# Patient Record
Sex: MALE | Race: WHITE | NOT HISPANIC OR LATINO | Employment: OTHER | URBAN - METROPOLITAN AREA
[De-identification: names, ages, dates, MRNs, and addresses within clinical notes are randomized per-mention and may not be internally consistent; named-entity substitution may affect disease eponyms.]

---

## 2022-05-02 PROCEDURE — 87077 CULTURE AEROBIC IDENTIFY: CPT | Performed by: SPECIALIST

## 2022-05-03 ENCOUNTER — LAB REQUISITION (OUTPATIENT)
Dept: LAB | Facility: HOSPITAL | Age: 77
End: 2022-05-03
Payer: MEDICARE

## 2022-05-03 DIAGNOSIS — R82.991 HYPOCITRATURIA: ICD-10-CM

## 2022-05-03 DIAGNOSIS — R30.9 PAINFUL MICTURITION, UNSPECIFIED: ICD-10-CM

## 2022-05-03 DIAGNOSIS — R31.1 BENIGN ESSENTIAL MICROSCOPIC HEMATURIA: ICD-10-CM

## 2022-05-05 LAB — BACTERIA UR CULT: ABNORMAL

## 2023-10-16 ENCOUNTER — LAB REQUISITION (OUTPATIENT)
Dept: LAB | Facility: HOSPITAL | Age: 78
End: 2023-10-16
Payer: MEDICARE

## 2023-10-16 DIAGNOSIS — R82.991 HYPOCITRATURIA: ICD-10-CM

## 2023-10-16 DIAGNOSIS — R30.9 PAINFUL MICTURITION, UNSPECIFIED: ICD-10-CM

## 2023-10-16 PROCEDURE — 87205 SMEAR GRAM STAIN: CPT | Performed by: SPECIALIST

## 2023-10-16 PROCEDURE — 87088 URINE BACTERIA CULTURE: CPT | Performed by: SPECIALIST

## 2023-10-16 PROCEDURE — 87077 CULTURE AEROBIC IDENTIFY: CPT | Performed by: SPECIALIST

## 2023-10-17 LAB
GRAM STN SPEC: NORMAL
GRAM STN SPEC: NORMAL

## 2023-10-18 LAB — BACTERIA UR CULT: ABNORMAL

## 2023-10-23 RX ORDER — TAMSULOSIN HYDROCHLORIDE 0.4 MG/1
0.4 CAPSULE ORAL DAILY
COMMUNITY

## 2023-10-23 RX ORDER — SIMVASTATIN 10 MG/1
10 TABLET, FILM COATED ORAL NIGHTLY
COMMUNITY

## 2023-10-23 RX ORDER — ASPIRIN 81 MG/1
81 TABLET ORAL DAILY
COMMUNITY

## 2023-10-23 RX ORDER — CIPROFLOXACIN 500 MG/1
500 TABLET ORAL 2 TIMES DAILY
COMMUNITY

## 2023-10-23 RX ORDER — FINASTERIDE 5 MG/1
5 TABLET, FILM COATED ORAL DAILY
COMMUNITY

## 2023-10-23 RX ORDER — LOSARTAN POTASSIUM AND HYDROCHLOROTHIAZIDE 12.5; 1 MG/1; MG/1
1 TABLET ORAL DAILY
COMMUNITY

## 2023-10-23 RX ORDER — MULTIVIT WITH MINERALS/HERBS
1 TABLET ORAL DAILY
COMMUNITY

## 2023-10-24 ENCOUNTER — HOSPITAL ENCOUNTER (OUTPATIENT)
Facility: HOSPITAL | Age: 78
Discharge: HOME | End: 2023-10-24
Attending: INTERNAL MEDICINE | Admitting: INTERNAL MEDICINE
Payer: MEDICARE

## 2023-10-24 ENCOUNTER — ANESTHESIA (OUTPATIENT)
Dept: ENDOSCOPY | Facility: HOSPITAL | Age: 78
End: 2023-10-24
Payer: MEDICARE

## 2023-10-24 ENCOUNTER — ANESTHESIA EVENT (OUTPATIENT)
Dept: ENDOSCOPY | Facility: HOSPITAL | Age: 78
End: 2023-10-24
Payer: MEDICARE

## 2023-10-24 DIAGNOSIS — D12.4 ADENOMATOUS POLYP OF DESCENDING COLON: Primary | ICD-10-CM

## 2023-10-24 DIAGNOSIS — Z86.010 PERSONAL HISTORY OF COLONIC POLYPS: ICD-10-CM

## 2023-10-24 PROBLEM — Z86.0100 PERSONAL HISTORY OF COLONIC POLYPS: Status: ACTIVE | Noted: 2023-10-24

## 2023-10-24 PROCEDURE — D9220A PRA ANESTHESIA: Mod: PT,ANES,, | Performed by: ANESTHESIOLOGY

## 2023-10-24 PROCEDURE — 25000003 PHARM REV CODE 250: Performed by: ANESTHESIOLOGY

## 2023-10-24 PROCEDURE — D9220A PRA ANESTHESIA: ICD-10-PCS | Mod: PT,ANES,, | Performed by: ANESTHESIOLOGY

## 2023-10-24 PROCEDURE — D9220A PRA ANESTHESIA: ICD-10-PCS | Mod: PT,CRNA,, | Performed by: NURSE ANESTHETIST, CERTIFIED REGISTERED

## 2023-10-24 PROCEDURE — 88305 TISSUE EXAM BY PATHOLOGIST: CPT | Performed by: INTERNAL MEDICINE

## 2023-10-24 PROCEDURE — 37000009 HC ANESTHESIA EA ADD 15 MINS: Performed by: INTERNAL MEDICINE

## 2023-10-24 PROCEDURE — 45385 COLONOSCOPY W/LESION REMOVAL: CPT | Mod: PT | Performed by: INTERNAL MEDICINE

## 2023-10-24 PROCEDURE — 27201423 OPTIME MED/SURG SUP & DEVICES STERILE SUPPLY: Performed by: INTERNAL MEDICINE

## 2023-10-24 PROCEDURE — 63600175 PHARM REV CODE 636 W HCPCS: Performed by: ANESTHESIOLOGY

## 2023-10-24 PROCEDURE — D9220A PRA ANESTHESIA: Mod: PT,CRNA,, | Performed by: NURSE ANESTHETIST, CERTIFIED REGISTERED

## 2023-10-24 PROCEDURE — 37000008 HC ANESTHESIA 1ST 15 MINUTES: Performed by: INTERNAL MEDICINE

## 2023-10-24 RX ORDER — LIDOCAINE HYDROCHLORIDE 20 MG/ML
INJECTION, SOLUTION EPIDURAL; INFILTRATION; INTRACAUDAL; PERINEURAL
Status: DISCONTINUED
Start: 2023-10-24 | End: 2023-10-24 | Stop reason: HOSPADM

## 2023-10-24 RX ORDER — PROPOFOL 10 MG/ML
VIAL (ML) INTRAVENOUS
Status: DISCONTINUED | OUTPATIENT
Start: 2023-10-24 | End: 2023-10-24

## 2023-10-24 RX ORDER — LIDOCAINE HYDROCHLORIDE 20 MG/ML
INJECTION INTRAVENOUS
Status: DISCONTINUED | OUTPATIENT
Start: 2023-10-24 | End: 2023-10-24

## 2023-10-24 RX ORDER — PROPOFOL 10 MG/ML
VIAL (ML) INTRAVENOUS
Status: COMPLETED
Start: 2023-10-24 | End: 2023-10-24

## 2023-10-24 RX ORDER — SODIUM CHLORIDE, SODIUM GLUCONATE, SODIUM ACETATE, POTASSIUM CHLORIDE AND MAGNESIUM CHLORIDE 30; 37; 368; 526; 502 MG/100ML; MG/100ML; MG/100ML; MG/100ML; MG/100ML
INJECTION, SOLUTION INTRAVENOUS ONCE
Status: COMPLETED | OUTPATIENT
Start: 2023-10-24 | End: 2023-10-24

## 2023-10-24 RX ADMIN — SODIUM CHLORIDE, SODIUM GLUCONATE, SODIUM ACETATE, POTASSIUM CHLORIDE AND MAGNESIUM CHLORIDE: 526; 502; 368; 37; 30 INJECTION, SOLUTION INTRAVENOUS at 10:10

## 2023-10-24 RX ADMIN — PROPOFOL 20 MG: 10 INJECTION, EMULSION INTRAVENOUS at 11:10

## 2023-10-24 RX ADMIN — LIDOCAINE HYDROCHLORIDE 50 MG: 20 INJECTION INTRAVENOUS at 11:10

## 2023-10-24 RX ADMIN — PROPOFOL 60 MG: 10 INJECTION, EMULSION INTRAVENOUS at 11:10

## 2023-10-24 RX ADMIN — SODIUM CHLORIDE, SODIUM GLUCONATE, SODIUM ACETATE, POTASSIUM CHLORIDE AND MAGNESIUM CHLORIDE: 526; 502; 368; 37; 30 INJECTION, SOLUTION INTRAVENOUS at 11:10

## 2023-10-24 NOTE — OP NOTE
Colonoscopy Procedure Note    Date of procedure: 10/24/2023     Surgeon: Kamaljit Hines    Referring MD:Vega      Procedure: Colonoscopy    Indications:  Personal history of numerous adenomatous colon polyps       Post op Diagnosis:  Good colon prep, successful endoscopy to the cecum and into a normal terminal ileum.  Single polyp at 50 cm in the descending colon, 4 mm in size, addressed with a cold snare.         Sedation: Per anesthesia:       Procedure Details: The patient was brought to the endoscopy suite after the risks, benefits, and alternatives of the procedure were described and the patient was given the opportunity to ask questions and signed informed consent. Patient was positioned in the left lateral decubitus position, continuous monitoring was initiated, and supplemental oxygen was provided via nasal cannula. Adequate sedation was achieved with the medications documented in chart and titrated during the procedure. A digital rectal exam was performed. Under direct visualization the colonoscope was introduced into the rectum and advanced to the cecum. The ileocecal valve and appendiceal orifice were identified. The terminal ileum was   intubated. The scope was withdrawn, and the mucosa was examined in a circular fashion. Retroflexion was done in the rectum. Air was evacuated from the colon and the procedure was completed. The patient tolerated the procedure well and was transferred to the recovery area in stable condition.     Findings:  The patient had a good preparation, and we saw through to the cecum and into a normal terminal ileum.  We did find 1 small polyp addressed as noted.    Impression and Recommendations:   Patient's exam was reassuring save for the single small polyp.  Given his years, this will serve as his last routine exam.  His children should certainly be screened at 46.  If he has more in the way of dysphagia down the line, upper endoscopy for dilation would be prudent.       Kamaljit  MD Donny

## 2023-10-24 NOTE — DISCHARGE SUMMARY
[facility] Endoscopy  Discharge Note  Short Stay    Procedure(s) (LRB):  COLON (N/A)      OUTCOME: Patient tolerated treatment/procedure well without complication and is now ready for discharge.    DISPOSITION: Home or Self Care    FINAL DIAGNOSIS:  <principal problem not specified>    FOLLOWUP: With primary care provider    DISCHARGE INSTRUCTIONS:    Discharge Procedure Orders   Diet general         Clinical Reference Documents Added to Patient Instructions         Document    COLONOSCOPY DISCHARGE INSTRUCTIONS (ENGLISH)            TIME SPENT ON DISCHARGE: 15 minutes

## 2023-10-24 NOTE — ANESTHESIA POSTPROCEDURE EVALUATION
Anesthesia Post Evaluation    Patient: Eligio Phillip    Procedure(s) Performed: Procedure(s) (LRB):  COLON (N/A)    Final Anesthesia Type: general      Patient location during evaluation: PACU  Patient participation: Yes- Able to Participate  Level of consciousness: awake and alert  Post-procedure vital signs: reviewed and stable  Pain management: adequate  Airway patency: patent    PONV status at discharge: No PONV  Anesthetic complications: no      Cardiovascular status: hemodynamically stable  Respiratory status: unassisted  Hydration status: euvolemic  Follow-up not needed.          Vitals Value Taken Time   /80 10/24/23 1207   Temp ** 10/24/23 1235   Pulse 89 10/24/23 1207   Resp 14 10/24/23 1138   SpO2 100 % 10/24/23 1207         No case tracking events are documented in the log.      Pain/Violeta Score: Violeta Score: 10 (10/24/2023 11:49 AM)

## 2023-10-24 NOTE — H&P
History and Physical           HPI:     Patient is a 77 y.o. male known from previous evaluation.  He does have a personal history of adenomatous colon polyps, and in fact typically has quite a number of polyps at endoscopy.  He was last evaluated in July of 2020 in that regard.  He had a good prep, and we addressed quite a number of polyps, propose a repeat exam at this juncture.      He suggests that he is now plagued by neuropathy, so his mobility is limited, and he has had resultant constipation.  He is otherwise without concern.      He does have a history of previous meat impaction, and Dr. Diop dilated him in my absence back in 2016.  He did undergo repeat endoscopy and dilation in 2017, though he tends to have a mucosal tear with 46 Iranian dilation.    PCP:  Andrew Glover Jr., MD    Review of patient's allergies indicates:  No Known Allergies     Past Medical History:  Past Medical History:   Diagnosis Date    Essential (primary) hypertension     Kidney infection     Kidney stones       Past Surgical History:  Past Surgical History:   Procedure Laterality Date    SINUS SURGERY        Family History:  History reviewed. No pertinent family history.  Social History:  Social History     Tobacco Use    Smoking status: Former     Types: Cigarettes     Start date: 1963    Smokeless tobacco: Current     Types: Chew   Substance Use Topics    Alcohol use: Not on file         Review of Systems:     Review of Systems    Objective:     VITAL SIGNS: 24 HR MIN & MAX LAST    Temp  Min: 97.9 °F (36.6 °C)  Max: 97.9 °F (36.6 °C)  97.9 °F (36.6 °C)        BP  Min: 119/64  Max: 119/64  119/64     Pulse  Min: 77  Max: 77  77     Resp  Min: 18  Max: 18  18    SpO2  Min: 96 %  Max: 96 %  96 %      Physical Exam  Constitutional:       Appearance: Normal appearance.   HENT:      Head: Atraumatic.      Mouth/Throat:      Mouth: Mucous membranes are moist.   Eyes:      Extraocular Movements: Extraocular movements intact.       Pupils: Pupils are equal, round, and reactive to light.   Cardiovascular:      Rate and Rhythm: Normal rate and regular rhythm.      Heart sounds: Normal heart sounds.   Pulmonary:      Effort: Pulmonary effort is normal.      Breath sounds: Normal breath sounds.   Abdominal:      Palpations: Abdomen is soft.      Tenderness: There is no abdominal tenderness.   Musculoskeletal:      Right lower leg: No edema.      Left lower leg: No edema.   Skin:     General: Skin is warm and dry.   Neurological:      General: No focal deficit present.      Mental Status: He is alert and oriented to person, place, and time.   Psychiatric:         Mood and Affect: Mood normal.         Behavior: Behavior normal.           No results found for this or any previous visit (from the past 48 hour(s)).    No results found.    @[facility]@    Assessment /Plan:   77-year-old with a history of multiple adenomatous colon polyps scheduled for surveillance colonoscopy  There are no problems to display for this patient.       Thank you for allowing us to participate in this patient's care.      None

## 2023-10-24 NOTE — ANESTHESIA PREPROCEDURE EVALUATION
10/24/2023  Eligio Phillip is a 77 y.o., male with ----------------------------  Essential (primary) hypertension  Kidney infection  Kidney stones  Wheelchair bound  UTI - chronic  HLD  Neuropathy      And ----------------------------  Sinus surgery    Presents for colonoscopy.      Pre-op Assessment    I have reviewed the NPO Status.      Review of Systems      Physical Exam  General: Well nourished, Cooperative, Alert and Oriented  Frail appearing  Airway:  Mallampati: II   Mouth Opening: Normal  TM Distance: Normal  Tongue: Normal  Neck ROM: Normal ROM    Dental:  Dentures  To be removed  Chest/Lungs:  Clear to auscultation, Normal Respiratory Rate    Heart:  Rate: Normal  Rhythm: Regular Rhythm        Anesthesia Plan  Type of Anesthesia, risks & benefits discussed:    Anesthesia Type: Gen Natural Airway  Intra-op Monitoring Plan: Standard ASA Monitors  Post Op Pain Control Plan: IV/PO Opioids PRN  Induction:  IV  Airway Plan: Direct  Informed Consent: Informed consent signed with the Patient and all parties understand the risks and agree with anesthesia plan.  All questions answered. Patient consented to blood products? No  ASA Score: 3  Day of Surgery Review of History & Physical: H&P Update referred to the surgeon/provider.  Anesthesia Plan Notes: Nasal cannula vs facemask supplemental oxygenation   For patients with MIGUEL/obesity, may consider SuperNoval Nasal CPAP      Ready For Surgery From Anesthesia Perspective.     .

## 2023-10-24 NOTE — TRANSFER OF CARE
Anesthesia Transfer of Care Note    Patient: Eligio Phillip    Procedure(s) Performed: Procedure(s) (LRB):  COLON (N/A)    Patient location: GI    Anesthesia Type: general    Transport from OR: Transported from OR on room air with adequate spontaneous ventilation    Post pain: adequate analgesia    Post assessment: no apparent anesthetic complications and tolerated procedure well    Post vital signs: stable    Level of consciousness: awake and alert    Nausea/Vomiting: no nausea/vomiting    Complications: none          Last vitals:   100/68, 74, 94%, 14

## 2023-10-25 VITALS
RESPIRATION RATE: 14 BRPM | BODY MASS INDEX: 21.47 KG/M2 | SYSTOLIC BLOOD PRESSURE: 118 MMHG | OXYGEN SATURATION: 100 % | HEIGHT: 70 IN | DIASTOLIC BLOOD PRESSURE: 80 MMHG | HEART RATE: 89 BPM | TEMPERATURE: 98 F | WEIGHT: 150 LBS

## 2023-10-25 LAB — PSYCHE PATHOLOGY RESULT: NORMAL

## 2023-12-14 ENCOUNTER — LAB REQUISITION (OUTPATIENT)
Dept: LAB | Facility: HOSPITAL | Age: 78
End: 2023-12-14
Payer: MEDICARE

## 2023-12-14 DIAGNOSIS — N39.0 URINARY TRACT INFECTION, SITE NOT SPECIFIED: ICD-10-CM

## 2023-12-14 PROCEDURE — 87086 URINE CULTURE/COLONY COUNT: CPT | Performed by: SPECIALIST

## 2023-12-16 LAB — BACTERIA UR CULT: NORMAL

## 2024-07-18 ENCOUNTER — LAB REQUISITION (OUTPATIENT)
Dept: LAB | Facility: HOSPITAL | Age: 79
End: 2024-07-18
Payer: MEDICARE

## 2024-07-18 DIAGNOSIS — R35.1 NOCTURIA: ICD-10-CM

## 2024-07-18 DIAGNOSIS — N39.0 URINARY TRACT INFECTION, SITE NOT SPECIFIED: ICD-10-CM

## 2024-07-18 DIAGNOSIS — R39.15 URGENCY OF URINATION: ICD-10-CM

## 2024-07-18 PROCEDURE — 87184 SC STD DISK METHOD PER PLATE: CPT | Performed by: SPECIALIST

## 2024-07-18 PROCEDURE — 87205 SMEAR GRAM STAIN: CPT | Performed by: SPECIALIST

## 2024-07-18 PROCEDURE — 87077 CULTURE AEROBIC IDENTIFY: CPT | Performed by: SPECIALIST

## 2024-07-19 LAB
GRAM STN SPEC: NORMAL

## 2024-07-21 LAB
BACTERIA UR CULT: ABNORMAL
BACTERIA UR CULT: ABNORMAL

## 2025-03-03 ENCOUNTER — HOSPITAL ENCOUNTER (OUTPATIENT)
Facility: HOSPITAL | Age: 80
Discharge: HOME | End: 2025-03-04
Attending: INTERNAL MEDICINE | Admitting: SURGERY
Payer: MEDICARE

## 2025-03-03 DIAGNOSIS — S06.5XAA SUBDURAL HEMATOMA: ICD-10-CM

## 2025-03-03 DIAGNOSIS — S06.2X1A: Primary | ICD-10-CM

## 2025-03-03 PROBLEM — S06.33AA INTRAPARENCHYMAL HEMATOMA OF BRAIN DUE TO TRAUMA: Status: ACTIVE | Noted: 2025-03-03

## 2025-03-03 LAB
ALBUMIN SERPL-MCNC: 3.7 G/DL (ref 3.4–4.8)
ALBUMIN/GLOB SERPL: 1.1 RATIO (ref 1.1–2)
ALP SERPL-CCNC: 89 UNIT/L (ref 40–150)
ALT SERPL-CCNC: 16 UNIT/L (ref 0–55)
ANION GAP SERPL CALC-SCNC: 12 MEQ/L
APTT PPP: 35.4 SECONDS (ref 23.2–33.7)
AST SERPL-CCNC: 21 UNIT/L (ref 5–34)
BASOPHILS # BLD AUTO: 0.07 X10(3)/MCL
BASOPHILS NFR BLD AUTO: 0.5 %
BILIRUB SERPL-MCNC: 0.5 MG/DL
BUN SERPL-MCNC: 17.2 MG/DL (ref 8.4–25.7)
CALCIUM SERPL-MCNC: 9.2 MG/DL (ref 8.8–10)
CHLORIDE SERPL-SCNC: 102 MMOL/L (ref 98–107)
CO2 SERPL-SCNC: 26 MMOL/L (ref 23–31)
CREAT SERPL-MCNC: 1.07 MG/DL (ref 0.72–1.25)
CREAT/UREA NIT SERPL: 16
EOSINOPHIL # BLD AUTO: 0.12 X10(3)/MCL (ref 0–0.9)
EOSINOPHIL NFR BLD AUTO: 0.8 %
ERYTHROCYTE [DISTWIDTH] IN BLOOD BY AUTOMATED COUNT: 12.6 % (ref 11.5–17)
GFR SERPLBLD CREATININE-BSD FMLA CKD-EPI: >60 ML/MIN/1.73/M2
GLOBULIN SER-MCNC: 3.4 GM/DL (ref 2.4–3.5)
GLUCOSE SERPL-MCNC: 108 MG/DL (ref 82–115)
HCT VFR BLD AUTO: 38.6 % (ref 42–52)
HGB BLD-MCNC: 12.9 G/DL (ref 14–18)
IMM GRANULOCYTES # BLD AUTO: 0.06 X10(3)/MCL (ref 0–0.04)
IMM GRANULOCYTES NFR BLD AUTO: 0.4 %
INR PPP: 0.9
LYMPHOCYTES # BLD AUTO: 1.95 X10(3)/MCL (ref 0.6–4.6)
LYMPHOCYTES NFR BLD AUTO: 13.8 %
MCH RBC QN AUTO: 31 PG (ref 27–31)
MCHC RBC AUTO-ENTMCNC: 33.4 G/DL (ref 33–36)
MCV RBC AUTO: 92.8 FL (ref 80–94)
MONOCYTES # BLD AUTO: 1.32 X10(3)/MCL (ref 0.1–1.3)
MONOCYTES NFR BLD AUTO: 9.3 %
NEUTROPHILS # BLD AUTO: 10.64 X10(3)/MCL (ref 2.1–9.2)
NEUTROPHILS NFR BLD AUTO: 75.2 %
NRBC BLD AUTO-RTO: 0 %
PLATELET # BLD AUTO: 321 X10(3)/MCL (ref 130–400)
PMV BLD AUTO: 11.1 FL (ref 7.4–10.4)
POTASSIUM SERPL-SCNC: 4 MMOL/L (ref 3.5–5.1)
PROT SERPL-MCNC: 7.1 GM/DL (ref 5.8–7.6)
PROTHROMBIN TIME: 12.4 SECONDS (ref 12.5–14.5)
RBC # BLD AUTO: 4.16 X10(6)/MCL (ref 4.7–6.1)
SODIUM SERPL-SCNC: 140 MMOL/L (ref 136–145)
WBC # BLD AUTO: 14.16 X10(3)/MCL (ref 4.5–11.5)

## 2025-03-03 PROCEDURE — 80053 COMPREHEN METABOLIC PANEL: CPT

## 2025-03-03 PROCEDURE — G0378 HOSPITAL OBSERVATION PER HR: HCPCS

## 2025-03-03 PROCEDURE — 94799 UNLISTED PULMONARY SVC/PX: CPT

## 2025-03-03 PROCEDURE — 99900035 HC TECH TIME PER 15 MIN (STAT)

## 2025-03-03 PROCEDURE — 85730 THROMBOPLASTIN TIME PARTIAL: CPT

## 2025-03-03 PROCEDURE — 99285 EMERGENCY DEPT VISIT HI MDM: CPT | Mod: 25

## 2025-03-03 PROCEDURE — 25000003 PHARM REV CODE 250

## 2025-03-03 PROCEDURE — 99223 1ST HOSP IP/OBS HIGH 75: CPT | Mod: AI,GC,, | Performed by: SURGERY

## 2025-03-03 PROCEDURE — 85025 COMPLETE CBC W/AUTO DIFF WBC: CPT

## 2025-03-03 PROCEDURE — 85610 PROTHROMBIN TIME: CPT

## 2025-03-03 RX ORDER — METHOCARBAMOL 500 MG/1
500 TABLET, FILM COATED ORAL EVERY 8 HOURS
Status: DISCONTINUED | OUTPATIENT
Start: 2025-03-03 | End: 2025-03-04 | Stop reason: HOSPADM

## 2025-03-03 RX ORDER — POLYETHYLENE GLYCOL 3350 17 G/17G
17 POWDER, FOR SOLUTION ORAL 2 TIMES DAILY
Status: DISCONTINUED | OUTPATIENT
Start: 2025-03-03 | End: 2025-03-04 | Stop reason: HOSPADM

## 2025-03-03 RX ORDER — OXYCODONE HYDROCHLORIDE 10 MG/1
10 TABLET ORAL EVERY 4 HOURS PRN
Refills: 0 | Status: DISCONTINUED | OUTPATIENT
Start: 2025-03-03 | End: 2025-03-04 | Stop reason: HOSPADM

## 2025-03-03 RX ORDER — ACETAMINOPHEN 325 MG/1
650 TABLET ORAL EVERY 6 HOURS
Status: DISCONTINUED | OUTPATIENT
Start: 2025-03-03 | End: 2025-03-04 | Stop reason: HOSPADM

## 2025-03-03 RX ORDER — TALC
6 POWDER (GRAM) TOPICAL NIGHTLY PRN
Status: DISCONTINUED | OUTPATIENT
Start: 2025-03-03 | End: 2025-03-04 | Stop reason: HOSPADM

## 2025-03-03 RX ORDER — LEVETIRACETAM 500 MG/1
500 TABLET ORAL 2 TIMES DAILY
Status: DISCONTINUED | OUTPATIENT
Start: 2025-03-03 | End: 2025-03-04 | Stop reason: HOSPADM

## 2025-03-03 RX ORDER — ADHESIVE BANDAGE
30 BANDAGE TOPICAL DAILY PRN
Status: DISCONTINUED | OUTPATIENT
Start: 2025-03-03 | End: 2025-03-04 | Stop reason: HOSPADM

## 2025-03-03 RX ORDER — GABAPENTIN 300 MG/1
300 CAPSULE ORAL 3 TIMES DAILY
Status: DISCONTINUED | OUTPATIENT
Start: 2025-03-03 | End: 2025-03-04 | Stop reason: HOSPADM

## 2025-03-03 RX ORDER — LABETALOL HYDROCHLORIDE 5 MG/ML
10 INJECTION, SOLUTION INTRAVENOUS EVERY 6 HOURS PRN
Status: DISCONTINUED | OUTPATIENT
Start: 2025-03-03 | End: 2025-03-04 | Stop reason: HOSPADM

## 2025-03-03 RX ORDER — OXYCODONE HYDROCHLORIDE 5 MG/1
5 TABLET ORAL EVERY 4 HOURS PRN
Refills: 0 | Status: DISCONTINUED | OUTPATIENT
Start: 2025-03-03 | End: 2025-03-04 | Stop reason: HOSPADM

## 2025-03-03 RX ORDER — DOCUSATE SODIUM 100 MG/1
100 CAPSULE, LIQUID FILLED ORAL 2 TIMES DAILY
Status: DISCONTINUED | OUTPATIENT
Start: 2025-03-03 | End: 2025-03-04 | Stop reason: HOSPADM

## 2025-03-03 RX ORDER — HYDRALAZINE HYDROCHLORIDE 20 MG/ML
10 INJECTION INTRAMUSCULAR; INTRAVENOUS EVERY 6 HOURS PRN
Status: DISCONTINUED | OUTPATIENT
Start: 2025-03-03 | End: 2025-03-04 | Stop reason: HOSPADM

## 2025-03-03 RX ADMIN — LEVETIRACETAM 500 MG: 500 TABLET, FILM COATED ORAL at 09:03

## 2025-03-03 RX ADMIN — METHOCARBAMOL 500 MG: 500 TABLET ORAL at 09:03

## 2025-03-03 RX ADMIN — POLYETHYLENE GLYCOL 3350 17 G: 17 POWDER, FOR SOLUTION ORAL at 09:03

## 2025-03-03 RX ADMIN — GABAPENTIN 300 MG: 300 CAPSULE ORAL at 09:03

## 2025-03-03 RX ADMIN — DOCUSATE SODIUM 100 MG: 100 CAPSULE, LIQUID FILLED ORAL at 09:03

## 2025-03-03 NOTE — ED PROVIDER NOTES
Encounter Date: 3/3/2025    SCRIBE #1 NOTE: I, Lisa Stuart, am scribing for, and in the presence of,  Jerry Man DO. I have scribed the following portions of the note - Other sections scribed: HPI, ROS, PE.       History     Chief Complaint   Patient presents with    Fall     Patient reports falling backwards out of wheelchair, - thinners, + LOC, GCS 15 in triage. Denies vision changes.      Patient is a 79 year old male with a history of hypertension that presents to the ED following a fall at home. Patient states he went down a small ramp in his wheelchair and fell backwards, hitting his head on the sidewalk. He reports possible LOC. He complains of head pain and dizziness. Notes relief with Advil taken at home. He denies neck pain and back pain. He is prescribed a baby ASA.    The history is provided by the patient and medical records.     Review of patient's allergies indicates:  No Known Allergies  Past Medical History:   Diagnosis Date    Essential (primary) hypertension     Kidney infection     Kidney stones      Past Surgical History:   Procedure Laterality Date    COLONOSCOPY, WITH 1 OR MORE BIOPSIES N/A 10/24/2023    Procedure: COLON;  Surgeon: Kamaljit Hines MD;  Location: [facility] ENDOSCOPY;  Service: Gastroenterology;  Laterality: N/A;    SINUS SURGERY       No family history on file.  Social History[1]  Review of Systems   Constitutional:  Negative for fever.   HENT:  Negative for sore throat.         Head pain   Respiratory:  Negative for shortness of breath.    Cardiovascular:  Negative for chest pain.   Gastrointestinal:  Negative for nausea.   Genitourinary:  Negative for dysuria.   Musculoskeletal:  Negative for back pain, neck pain and neck stiffness.   Skin:  Negative for rash.   Neurological:  Positive for dizziness. Negative for tremors, seizures, syncope, facial asymmetry, speech difficulty, weakness, light-headedness, numbness and headaches.        Possible LOC.    Hematological:   Does not bruise/bleed easily.   All other systems reviewed and are negative.      Physical Exam     Initial Vitals [03/03/25 1527]   BP Pulse Resp Temp SpO2   128/83 (!) 112 20 97.4 °F (36.3 °C) 97 %      MAP       --         Physical Exam    Constitutional: He appears well-developed and well-nourished. He is cooperative.  Non-toxic appearance. He does not appear ill.   HENT:   Head: Normocephalic. Mouth/Throat: Uvula is midline, oropharynx is clear and moist and mucous membranes are normal.   Non-tender hematoma to occiput. No abrasions or lacerations.    Eyes: Conjunctivae, EOM and lids are normal. Pupils are equal, round, and reactive to light.   Neck: Trachea normal and phonation normal. Neck supple.    Full passive range of motion without pain.     Cardiovascular:  Normal rate, regular rhythm, normal heart sounds and normal pulses.           No murmur heard.  Pulmonary/Chest: No accessory muscle usage. No tachypnea. No respiratory distress. He has no decreased breath sounds. He has no wheezes. He has no rhonchi. He has no rales.   Abdominal: Abdomen is soft. Bowel sounds are normal. He exhibits no distension. There is no abdominal tenderness. No hernia. There is no rebound and no guarding.   Musculoskeletal:      Cervical back: Full passive range of motion without pain and neck supple.      Comments: Pelvis stable.      Neurological: He is alert and oriented to person, place, and time. GCS score is 15. GCS eye subscore is 4. GCS verbal subscore is 5. GCS motor subscore is 6.   No neurological focal deficits.    Skin: Skin is warm, dry and intact. Capillary refill takes less than 2 seconds. No rash noted.   Psychiatric: He has a normal mood and affect. His speech is normal and behavior is normal. Judgment and thought content normal. Cognition and memory are normal.         ED Course   Critical Care    Date/Time: 3/3/2025 5:24 PM    Performed by: Jerry Man DO  Authorized by: Jerry Man DO  Direct  patient critical care time: 15 minutes  Additional history critical care time: 5 minutes  Ordering / reviewing critical care time: 5 minutes  Documentation critical care time: 5 minutes  Consulting other physicians critical care time: 5 minutes  Consult with family critical care time: 5 minutes  Total critical care time (exclusive of procedural time) : 40 minutes  Critical care time was exclusive of separately billable procedures and treating other patients.  Critical care was necessary to treat or prevent imminent or life-threatening deterioration of the following conditions: trauma and CNS failure or compromise.  Critical care was time spent personally by me on the following activities: development of treatment plan with patient or surrogate, discussions with consultants, examination of patient, obtaining history from patient or surrogate, ordering and performing treatments and interventions, ordering and review of laboratory studies, ordering and review of radiographic studies, pulse oximetry, re-evaluation of patient's condition and review of old charts.        Labs Reviewed   APTT - Abnormal       Result Value    PTT 35.4 (*)    PROTIME-INR - Abnormal    PT 12.4 (*)     INR 0.9      Narrative:     Protimes are used to monitor anticoagulant agents such as warfarin. PT INR values are based on the current patient normal mean and the ELADIO value for the specific instrument reagent used.  **Routine theraputic target values for the INR are 2.0-3.0**   CBC WITH DIFFERENTIAL - Abnormal    WBC 14.16 (*)     RBC 4.16 (*)     Hgb 12.9 (*)     Hct 38.6 (*)     MCV 92.8      MCH 31.0      MCHC 33.4      RDW 12.6      Platelet 321      MPV 11.1 (*)     Neut % 75.2      Lymph % 13.8      Mono % 9.3      Eos % 0.8      Basophil % 0.5      Imm Grans % 0.4      Neut # 10.64 (*)     Lymph # 1.95      Mono # 1.32 (*)     Eos # 0.12      Baso # 0.07      Imm Gran # 0.06 (*)     NRBC% 0.0     CBC W/ AUTO DIFFERENTIAL    Narrative:      The following orders were created for panel order CBC auto differential.  Procedure                               Abnormality         Status                     ---------                               -----------         ------                     CBC with Differential[[id number]]       Abnormal            Final result                 Please view results for these tests on the individual orders.   COMPREHENSIVE METABOLIC PANEL    Sodium 140      Potassium 4.0      Chloride 102      CO2 26      Glucose 108      Blood Urea Nitrogen 17.2      Creatinine 1.07      Calcium 9.2      Protein Total 7.1      Albumin 3.7      Globulin 3.4      Albumin/Globulin Ratio 1.1      Bilirubin Total 0.5      ALP 89      ALT 16      AST 21      eGFR >60      Anion Gap 12.0      BUN/Creatinine Ratio 16            Imaging Results               CT Head Without Contrast (Final result)  Result time 03/03/25 15:52:14      Final result by Ronald Gama MD (03/03/25 15:52:14)                   Impression:      Couple of punctate areas of hyperattenuation seen as outlined above could represent small areas of hemorrhage as outlined above.  Short-term follow-up is recommended    Cephalhematoma posteriorly in the midline near the cerebral convexity    This report was flagged in Epic as abnormal.      Electronically signed by: Bhupinder Gama  Date:    03/03/2025  Time:    15:52               Narrative:    EXAMINATION:  CT HEAD WITHOUT CONTRAST    CLINICAL HISTORY:  Head trauma, moderate-severe;    TECHNIQUE:  Multiple axial images were obtained from the base of the brain to the vertex without contrast administration.  Sagittal and coronal reconstructions were performed. .Automatic exposure control  (AEC) is utilized to reduce patient radiation exposure.    COMPARISON:  None    FINDINGS:  There is no intracranial mass or lesion seen.  There is a punctate area of hyperattenuation in the right frontal region at the cerebral convexity image 40  series 5.  It measures 4 mm.  It could represent a small area of subdural blood.  There is area of increased attenuation involving the cortex of the left frontal region posteriorly on image number 39 through 42 series 5.  This could represent a small parenchymal contusion..  There is a punctate area of increased attenuation along the falx on the left side image 42 series 5.  This could represent a small punctate area of hemorrhage.  Short-term follow-up is recommended.  No midline shift is seen.  No infarct is seen.  The ventricles and basilar cisterns appear normal.  .    Posterior fossa appears normal.  The calvarium is intact.  There is a soft tissue cephalhematoma posteriorly in the midline.  It is near the cerebral convexity.  The paranasal sinuses shows evidence of bilateral maxillary sinusitis.  None.                                       Medications   acetaminophen tablet 650 mg (0 mg Oral Hold 3/3/25 1830)   oxyCODONE immediate release tablet 5 mg (has no administration in time range)   oxyCODONE immediate release tablet Tab 10 mg (has no administration in time range)   methocarbamoL tablet 500 mg (has no administration in time range)   gabapentin capsule 300 mg (has no administration in time range)   melatonin tablet 6 mg (has no administration in time range)   polyethylene glycol packet 17 g (has no administration in time range)   docusate sodium capsule 100 mg (has no administration in time range)   magnesium hydroxide 400 mg/5 ml suspension 2,400 mg (has no administration in time range)   levETIRAcetam tablet 500 mg (has no administration in time range)   hydrALAZINE injection 10 mg (has no administration in time range)   labetaloL injection 10 mg (has no administration in time range)     Medical Decision Making  79-year-old male presenting after mechanical fall.  Vital signs show tachycardia but otherwise stable and afebrile    Differential Diagnosis:   Judging by the patient's chief complaint and  pertinent history, the patient has the following possible differential diagnoses, including but not limited to the following.  Some of these are deemed to be lower likelihood and some more likely based on my physical exam and history combined with possible lab work and/or imaging studies.   Please see the pertinent studies, and refer to the HPI.  Some of these diagnoses will take further evaluation to fully rule out, perhaps as an outpatient and the patient was encouraged to follow up when discharged for more comprehensive evaluation      Differential diagnosis includes, but is not limited to, intracranial hemorrhage, fracture, dislocation, contusion, soft tissue hematoma, and mechanical fall.     Problems Addressed:  Intracranial hematoma following injury, with loss of consciousness of 30 minutes or less, initial encounter: acute illness or injury that poses a threat to life or bodily functions  Subdural hematoma: acute illness or injury that poses a threat to life or bodily functions    Amount and/or Complexity of Data Reviewed  Independent Historian: caregiver  External Data Reviewed: labs, radiology and notes.  Labs: ordered. Decision-making details documented in ED Course.  Radiology: ordered and independent interpretation performed. Decision-making details documented in ED Course.    Risk  Decision regarding hospitalization.            Scribe Attestation:   Scribe #1: I performed the above scribed service and the documentation accurately describes the services I performed. I attest to the accuracy of the note.    Attending Attestation:           Physician Attestation for Scribe:  Physician Attestation Statement for Scribe #1: I, Jerry Man DO, reviewed documentation, as scribed by Lisa Stuart in my presence, and it is both accurate and complete.             ED Course as of 03/03/25 1920   Mon Mar 03, 2025   1630 CT of the head is concerning for punctate intracranial hemorrhage, cephalohematoma also  noted. [MM]   1646 Paged neurosurgery  [ED]   1656 Paged trauma surgery  [ED]   1657 Discussed with RENALDO Rao with trauma surgery  [ED]   1718 Discussed with MD Fady with neurosurgery  [ED]   1719 Dr. Shrestha would like repeat head CT in the morning, hold antiplatelets for 1 week, stable for floor admission.  This was discussed with Trauma surgery, who will admit patient. [MM]      ED Course User Index  [ED] Lisa Stuart  [MM] Jerry Man DO                           Clinical Impression:  Final diagnoses:  [S06.2X1A] Intracranial hematoma following injury, with loss of consciousness of 30 minutes or less, initial encounter (Primary)  [S06.5XAA] Subdural hematoma          ED Disposition Condition    Observation                     [1]   Social History  Tobacco Use    Smoking status: Former     Types: Cigarettes     Start date: 1963    Smokeless tobacco: Current     Types: Chew        Jerry Man DO  03/03/25 1920

## 2025-03-03 NOTE — FIRST PROVIDER EVALUATION
"Medical screening examination initiated.  I have conducted a focused provider triage encounter, findings are as follows:    Brief history of present illness:  79 year old male presents to her after fall out of wheelchair. Hit head on concrete. +LOC. Denies neck pain. AAO x 4    Vitals:    03/03/25 1527   BP: 128/83   BP Location: Left arm   Pulse: (!) 112   Resp: 20   Temp: 97.4 °F (36.3 °C)   TempSrc: Oral   SpO2: 97%   Weight: 70.3 kg (155 lb)   Height: 5' 10" (1.778 m)       Pertinent physical exam:  awake and alert, nad    Brief workup plan:  imaging     Preliminary workup initiated; this workup will be continued and followed by the physician or advanced practice provider that is assigned to the patient when roomed.  " Resident Renée: pt re-evaluated clinically, with preliminary evaluation without any demonstration of evidence of acute pathology on exam. Pt was informed that she requires a MRI for further evaluation of her symptoms, as a subacute infarct cannot be ruled out on CT Head alone - but patient declines, understanding, verbalizing all risks, benefits, and treatment alternatives to declining MRI. Our primary team reached out to her Neurologist (Evelio Chandler MD - [phone]) and informed him of her clinical course in the ED, and we arranged for expedited follow up. He will follow up with her outpatient for further evaluation of her symptoms. Clinical re-evaluation with demonstration of stable gait, ability to tolerate PO intake - stable for discharge. VSS. Pt reports she will take cab home.

## 2025-03-03 NOTE — H&P
Trauma Surgery   History and Physical Note    Patient Name: Eligio Phillip  YOB: 1945  Date: 03/03/2025 5:24 PM  Date of Admission: 3/3/2025  HD#0  POD#* No surgery found *    PRESENTING HISTORY   Chief Complaint/Reason for Admission: Subdural hematoma    History of Present Illness:  Patient is a 79 year old male who presents following a fall from his wheelchair. Reports was going up the ramp forward and fell back striking his head. Reports he usually goes up backwards but thought he could go forward. Questionable loss of consciousness by report. Headache following fall, relieved with Advil. He reports he takes a baby aspirin daily. CT head imaging significant for punctate hemorrhages. Neurosurgery recommends admission with repeat CT in AM. Patient reports uses WC at home due to neuropathy.     Review of Systems:  12 point ROS negative except as stated in HPI    PAST HISTORY:   Past medical history:  HTN, prostate CA s/p radiation, neuropathy, kidney stones  Past Medical History:   Diagnosis Date    Essential (primary) hypertension     Kidney infection     Kidney stones        Past surgical history:  Past Surgical History:   Procedure Laterality Date    COLONOSCOPY, WITH 1 OR MORE BIOPSIES N/A 10/24/2023    Procedure: COLON;  Surgeon: Kamaljit Hines MD;  Location: [facility] ENDOSCOPY;  Service: Gastroenterology;  Laterality: N/A;    SINUS SURGERY         Family history:  No family history on file.    Social history:  Social History     Socioeconomic History    Marital status:    Tobacco Use    Smoking status: Former     Types: Cigarettes     Start date: 1963    Smokeless tobacco: Current     Types: Chew     Social Drivers of Health     Financial Resource Strain: Low Risk  (6/3/2024)    [facility] and Affiliates    Overall Financial Resource Strain (CARDIA)     Difficulty of Paying Living Expenses: Not hard at all    Food Insecurity: No Food Insecurity (6/3/2024)    [facility]    Hunger Vital Sign     Worried About Running Out of Food in the Last Year: Never true     Ran Out of Food in the Last Year: Never true   Transportation Needs: No Transportation Needs (6/3/2024)    [facility]    PRAPARE - Transportation     Lack of Transportation (Medical): No     Lack of Transportation (Non-Medical): No   Physical Activity: Inactive (6/3/2024)    [facility]    Exercise Vital Sign     Days of Exercise per Week: 0 days     Minutes of Exercise per Session: 0 min   Stress: No Stress Concern Present (6/3/2024)    [facility]    [employer] [place] [employer] - Occupational Stress Questionnaire     Feeling of Stress : Not at all   Housing Stability: Low Risk  (6/3/2024)    [facility]    Housing Stability Vital Sign     Unable to Pay for Housing in the Last Year: No     Number of Places Lived in the Last Year: 1     Unstable Housing in the Last Year: No     Tobacco Use History[1]   Social History     Substance and Sexual Activity   Alcohol Use None        MEDICATIONS & ALLERGIES:   Allergies: Review of patient's allergies indicates:  No Known Allergies  Home Meds:   Current Outpatient Medications   Medication Instructions    aspirin (ECOTRIN) 81 mg, Oral, Daily    b complex vitamins tablet 1 tablet, Oral, Daily    ciprofloxacin HCl (CIPRO) 500 mg, Oral, 2 times daily    ergocalciferol, vitamin D2, (VITAMIN D ORAL) Oral    finasteride (PROSCAR) 5 mg, Oral, Daily    fluticasone (VERAMYST) 27.5  "mcg/actuation nasal spray 2 sprays, Nasal, Daily PRN    losartan-hydrochlorothiazide 100-12.5 mg (HYZAAR) 100-12.5 mg Tab 1 tablet, Oral, Daily    simvastatin (ZOCOR) 10 mg, Oral, Nightly    tamsulosin (FLOMAX) 0.4 mg, Oral, Daily    Medications Ordered Prior to Encounter[2]   No current facility-administered medications on file prior to encounter.     Current Outpatient Medications on File Prior to Encounter   Medication Sig    aspirin (ECOTRIN) 81 MG EC tablet Take 81 mg by mouth once daily.    b complex vitamins tablet Take 1 tablet by mouth once daily.    ciprofloxacin HCl (CIPRO) 500 MG tablet Take 500 mg by mouth 2 (two) times daily.    ergocalciferol, vitamin D2, (VITAMIN D ORAL) Take by mouth.    finasteride (PROSCAR) 5 mg tablet Take 5 mg by mouth once daily.    fluticasone (VERAMYST) 27.5 mcg/actuation nasal spray 2 sprays by Nasal route daily as needed for Rhinitis.    losartan-hydrochlorothiazide 100-12.5 mg (HYZAAR) 100-12.5 mg Tab Take 1 tablet by mouth once daily.    simvastatin (ZOCOR) 10 MG tablet Take 10 mg by mouth every evening.    tamsulosin (FLOMAX) 0.4 mg Cap Take 0.4 mg by mouth once daily.     OBJECTIVE:   Vital Signs:  VITAL SIGNS: 24 HR MIN & MAX LAST   Temp  Min: 97.4 °F (36.3 °C)  Max: 97.4 °F (36.3 °C)  97.4 °F (36.3 °C)   BP  Min: 128/83  Max: 128/83  128/83    Pulse  Min: 112  Max: 112  (!) 112    Resp  Min: 20  Max: 20  20    SpO2  Min: 97 %  Max: 97 %  97 %      HT: 5' 10" (177.8 cm)  WT: 70.3 kg (155 lb)  BMI: 22.2   Intake/output: No intake/output data recorded.       Physical Exam:  General:  Well developed, well nourished, no acute distress  HEENT:  Normocephalic, small contusion posterior scalp  CV:  RR, 2+ radials bilaterally  Resp/chest: NWOB  GI:  Abdomen soft, non-tender, non-distended  :  Deferred  MSK:  No muscle atrophy, cyanosis, peripheral edema, moving all extremities spontaneously.   Neuro: GCS 15. CNII-XII grossly intact, alert and oriented to person, place, and " "time. Strength and motor function grossly intact to all extremities, sensation intact to all extremities.  Skin/Wounds:  warm, dry    Labs:  Troponin:  No results for input(s): "TROPONINI" in the last 72 hours.  CBC:  Recent Labs     03/03/25  1643   WBC 14.16*   RBC 4.16*   HGB 12.9*   HCT 38.6*      MCV 92.8   MCH 31.0   MCHC 33.4     CMP:  Recent Labs     03/03/25  1643   CALCIUM 9.2   ALBUMIN 3.7      K 4.0   CO2 26      BUN 17.2   CREATININE 1.07   ALKPHOS 89   ALT 16   AST 21   BILITOT 0.5     Lactic Acid:  No results for input(s): "LACTATE" in the last 72 hours.  ETOH:  No results for input(s): "ETHANOL" in the last 72 hours.   Urine Drug Screen:  No results for input(s): "COCAINE", "OPIATE", "BARBITURATE", "AMPHETAMINE", "FENTANYL", "CANNABINOIDS", "MDMA" in the last 72 hours.    Invalid input(s): "BENZODIAZEPINE", "PHENCYCLIDINE"   ABG  No results for input(s): "PH", "PO2", "PCO2", "HCO3", "BE" in the last 168 hours.    I have reviewed all pertinent lab results within the past 24 hours.    Diagnostic Results:  Imaging Results               CT Head Without Contrast (Final result)  Result time 03/03/25 15:52:14      Final result by Ronald Gama MD (03/03/25 15:52:14)                   Impression:      Couple of punctate areas of hyperattenuation seen as outlined above could represent small areas of hemorrhage as outlined above.  Short-term follow-up is recommended    Cephalhematoma posteriorly in the midline near the cerebral convexity    This report was flagged in Epic as abnormal.      Electronically signed by: Bhupinder Gama  Date:    03/03/2025  Time:    15:52               Narrative:    EXAMINATION:  CT HEAD WITHOUT CONTRAST    CLINICAL HISTORY:  Head trauma, moderate-severe;    TECHNIQUE:  Multiple axial images were obtained from the base of the brain to the vertex without contrast administration.  Sagittal and coronal reconstructions were performed. .Automatic exposure " control  (AEC) is utilized to reduce patient radiation exposure.    COMPARISON:  None    FINDINGS:  There is no intracranial mass or lesion seen.  There is a punctate area of hyperattenuation in the right frontal region at the cerebral convexity image 40 series 5.  It measures 4 mm.  It could represent a small area of subdural blood.  There is area of increased attenuation involving the cortex of the left frontal region posteriorly on image number 39 through 42 series 5.  This could represent a small parenchymal contusion..  There is a punctate area of increased attenuation along the falx on the left side image 42 series 5.  This could represent a small punctate area of hemorrhage.  Short-term follow-up is recommended.  No midline shift is seen.  No infarct is seen.  The ventricles and basilar cisterns appear normal.  .    Posterior fossa appears normal.  The calvarium is intact.  There is a soft tissue cephalhematoma posteriorly in the midline.  It is near the cerebral convexity.  The paranasal sinuses shows evidence of bilateral maxillary sinusitis.  None.                                     I have reviewed all pertinent imaging results/findings within the past 24 hours.    ASSESSMENT & PLAN:      Punctate SDH/IPH  - Neurosurgery consulted  - Q2H neuro checks  - Keppra BID x 7 days  - BP < 150/90  - CT Head in AM    Fall from WC  - Cardiac diet  - Daily labs   - MM pain control  - Frequent IS  - SCDs and Holding anticoagulation in light of ICH    - home med rec    Maira Holloway, AGACN-BC, FNP-BC  Trauma Surgery  [facility]  C: [phone]           [1]   Social History  Tobacco Use   Smoking Status Former    Types: Cigarettes    Start date: 1963   Smokeless Tobacco Current    Types: Chew   [2]   No current facility-administered medications on file prior to encounter.     Current Outpatient Medications on File Prior to Encounter   Medication Sig Dispense Refill    aspirin (ECOTRIN) 81 MG EC  tablet Take 81 mg by mouth once daily.      b complex vitamins tablet Take 1 tablet by mouth once daily.      ciprofloxacin HCl (CIPRO) 500 MG tablet Take 500 mg by mouth 2 (two) times daily.      ergocalciferol, vitamin D2, (VITAMIN D ORAL) Take by mouth.      finasteride (PROSCAR) 5 mg tablet Take 5 mg by mouth once daily.      fluticasone (VERAMYST) 27.5 mcg/actuation nasal spray 2 sprays by Nasal route daily as needed for Rhinitis.      losartan-hydrochlorothiazide 100-12.5 mg (HYZAAR) 100-12.5 mg Tab Take 1 tablet by mouth once daily.      simvastatin (ZOCOR) 10 MG tablet Take 10 mg by mouth every evening.      tamsulosin (FLOMAX) 0.4 mg Cap Take 0.4 mg by mouth once daily.

## 2025-03-04 VITALS
BODY MASS INDEX: 22.19 KG/M2 | WEIGHT: 155 LBS | HEART RATE: 75 BPM | SYSTOLIC BLOOD PRESSURE: 119 MMHG | TEMPERATURE: 97 F | DIASTOLIC BLOOD PRESSURE: 64 MMHG | OXYGEN SATURATION: 93 % | HEIGHT: 70 IN | RESPIRATION RATE: 18 BRPM

## 2025-03-04 LAB
ALBUMIN SERPL-MCNC: 3.4 G/DL (ref 3.4–4.8)
ALBUMIN/GLOB SERPL: 1.2 RATIO (ref 1.1–2)
ALP SERPL-CCNC: 82 UNIT/L (ref 40–150)
ALT SERPL-CCNC: 14 UNIT/L (ref 0–55)
ANION GAP SERPL CALC-SCNC: 8 MEQ/L
AST SERPL-CCNC: 16 UNIT/L (ref 5–34)
BASOPHILS # BLD AUTO: 0.06 X10(3)/MCL
BASOPHILS NFR BLD AUTO: 0.5 %
BILIRUB SERPL-MCNC: 0.6 MG/DL
BUN SERPL-MCNC: 22.5 MG/DL (ref 8.4–25.7)
CALCIUM SERPL-MCNC: 9.1 MG/DL (ref 8.8–10)
CHLORIDE SERPL-SCNC: 101 MMOL/L (ref 98–107)
CO2 SERPL-SCNC: 31 MMOL/L (ref 23–31)
CREAT SERPL-MCNC: 1.13 MG/DL (ref 0.72–1.25)
CREAT/UREA NIT SERPL: 20
EOSINOPHIL # BLD AUTO: 0.22 X10(3)/MCL (ref 0–0.9)
EOSINOPHIL NFR BLD AUTO: 1.9 %
ERYTHROCYTE [DISTWIDTH] IN BLOOD BY AUTOMATED COUNT: 12.8 % (ref 11.5–17)
GFR SERPLBLD CREATININE-BSD FMLA CKD-EPI: >60 ML/MIN/1.73/M2
GLOBULIN SER-MCNC: 2.8 GM/DL (ref 2.4–3.5)
GLUCOSE SERPL-MCNC: 95 MG/DL (ref 82–115)
HCT VFR BLD AUTO: 35.3 % (ref 42–52)
HGB BLD-MCNC: 11.8 G/DL (ref 14–18)
IMM GRANULOCYTES # BLD AUTO: 0.04 X10(3)/MCL (ref 0–0.04)
IMM GRANULOCYTES NFR BLD AUTO: 0.4 %
LYMPHOCYTES # BLD AUTO: 3.13 X10(3)/MCL (ref 0.6–4.6)
LYMPHOCYTES NFR BLD AUTO: 27.7 %
MAGNESIUM SERPL-MCNC: 1.9 MG/DL (ref 1.6–2.6)
MCH RBC QN AUTO: 30.9 PG (ref 27–31)
MCHC RBC AUTO-ENTMCNC: 33.4 G/DL (ref 33–36)
MCV RBC AUTO: 92.4 FL (ref 80–94)
MONOCYTES # BLD AUTO: 1.45 X10(3)/MCL (ref 0.1–1.3)
MONOCYTES NFR BLD AUTO: 12.8 %
NEUTROPHILS # BLD AUTO: 6.4 X10(3)/MCL (ref 2.1–9.2)
NEUTROPHILS NFR BLD AUTO: 56.7 %
NRBC BLD AUTO-RTO: 0 %
PHOSPHATE SERPL-MCNC: 3.8 MG/DL (ref 2.3–4.7)
PLATELET # BLD AUTO: 270 X10(3)/MCL (ref 130–400)
PMV BLD AUTO: 10.5 FL (ref 7.4–10.4)
POTASSIUM SERPL-SCNC: 4.1 MMOL/L (ref 3.5–5.1)
PROT SERPL-MCNC: 6.2 GM/DL (ref 5.8–7.6)
RBC # BLD AUTO: 3.82 X10(6)/MCL (ref 4.7–6.1)
SODIUM SERPL-SCNC: 140 MMOL/L (ref 136–145)
WBC # BLD AUTO: 11.3 X10(3)/MCL (ref 4.5–11.5)

## 2025-03-04 PROCEDURE — 84100 ASSAY OF PHOSPHORUS: CPT

## 2025-03-04 PROCEDURE — G0378 HOSPITAL OBSERVATION PER HR: HCPCS

## 2025-03-04 PROCEDURE — 80053 COMPREHEN METABOLIC PANEL: CPT

## 2025-03-04 PROCEDURE — 36415 COLL VENOUS BLD VENIPUNCTURE: CPT

## 2025-03-04 PROCEDURE — 25000003 PHARM REV CODE 250

## 2025-03-04 PROCEDURE — 85025 COMPLETE CBC W/AUTO DIFF WBC: CPT

## 2025-03-04 PROCEDURE — 94799 UNLISTED PULMONARY SVC/PX: CPT

## 2025-03-04 PROCEDURE — 99900031 HC PATIENT EDUCATION (STAT)

## 2025-03-04 PROCEDURE — 99900035 HC TECH TIME PER 15 MIN (STAT)

## 2025-03-04 PROCEDURE — 83735 ASSAY OF MAGNESIUM: CPT

## 2025-03-04 RX ORDER — LEVETIRACETAM 500 MG/1
500 TABLET ORAL 2 TIMES DAILY
Qty: 12 TABLET | Refills: 0 | Status: SHIPPED | OUTPATIENT
Start: 2025-03-04 | End: 2025-03-10

## 2025-03-04 RX ORDER — GABAPENTIN 300 MG/1
300 CAPSULE ORAL 3 TIMES DAILY
Qty: 30 CAPSULE | Refills: 0 | Status: SHIPPED | OUTPATIENT
Start: 2025-03-04 | End: 2025-03-14

## 2025-03-04 RX ORDER — METHOCARBAMOL 500 MG/1
500 TABLET, FILM COATED ORAL EVERY 8 HOURS PRN
Qty: 30 TABLET | Refills: 0 | Status: SHIPPED | OUTPATIENT
Start: 2025-03-04 | End: 2025-03-14

## 2025-03-04 RX ORDER — MONTELUKAST SODIUM 10 MG/1
10 TABLET ORAL NIGHTLY
COMMUNITY

## 2025-03-04 RX ORDER — LORATADINE 10 MG/1
10 TABLET ORAL DAILY
COMMUNITY

## 2025-03-04 RX ADMIN — DOCUSATE SODIUM 100 MG: 100 CAPSULE, LIQUID FILLED ORAL at 08:03

## 2025-03-04 RX ADMIN — ACETAMINOPHEN 650 MG: 325 TABLET, FILM COATED ORAL at 12:03

## 2025-03-04 RX ADMIN — GABAPENTIN 300 MG: 300 CAPSULE ORAL at 08:03

## 2025-03-04 RX ADMIN — LEVETIRACETAM 500 MG: 500 TABLET, FILM COATED ORAL at 08:03

## 2025-03-04 RX ADMIN — POLYETHYLENE GLYCOL 3350 17 G: 17 POWDER, FOR SOLUTION ORAL at 08:03

## 2025-03-04 RX ADMIN — ACETAMINOPHEN 650 MG: 325 TABLET, FILM COATED ORAL at 06:03

## 2025-03-04 RX ADMIN — METHOCARBAMOL 500 MG: 500 TABLET ORAL at 06:03

## 2025-03-04 NOTE — PLAN OF CARE
Pt doesn't have a list of his home medications available. He will ask a family member to bring a list or the meds today.    Problem: Adult Inpatient Plan of Care  Goal: Absence of Hospital-Acquired Illness or Injury  Outcome: Progressing     Problem: Adult Inpatient Plan of Care  Goal: Optimal Comfort and Wellbeing  Outcome: Progressing     Problem: Fall Injury Risk  Goal: Absence of Fall and Fall-Related Injury  Outcome: Progressing     Problem: Hypertension Acute  Goal: Blood Pressure Within Desired Range  Outcome: Progressing     Problem: Seizure, Active Management  Goal: Absence of Seizure/Seizure-Related Injury  Outcome: Progressing

## 2025-03-04 NOTE — PLAN OF CARE
Problem: Adult Inpatient Plan of Care  Goal: Plan of Care Review  Outcome: Progressing  Goal: Patient-Specific Goal (Individualized)  Outcome: Progressing  Goal: Absence of Hospital-Acquired Illness or Injury  Outcome: Progressing  Goal: Optimal Comfort and Wellbeing  Outcome: Progressing  Goal: Readiness for Transition of Care  Outcome: Progressing     Problem: Fall Injury Risk  Goal: Absence of Fall and Fall-Related Injury  Outcome: Progressing     Problem: Hypertension Acute  Goal: Blood Pressure Within Desired Range  Outcome: Progressing     Problem: Seizure, Active Management  Goal: Absence of Seizure/Seizure-Related Injury  Outcome: Progressing

## 2025-03-04 NOTE — PROGRESS NOTES
TERTIARY TRAUMA SURVEY (TTS)    List Injuries Identified to Date:   1. SDH  2. IPH    [x]Problems list reviewed  List Operations and Procedures:   1. None    Past Surgical History:   Procedure Laterality Date    COLONOSCOPY, WITH 1 OR MORE BIOPSIES N/A 10/24/2023    Procedure: COLON;  Surgeon: Kamaljit Hines MD;  Location: [facility] ENDOSCOPY;  Service: Gastroenterology;  Laterality: N/A;    SINUS SURGERY         Incidental findings:   1. None    Past Medical History:   1. HTN  2. HLD  3. Neuropathy  4. COPD  5. BPH    Active Ambulatory Problems     Diagnosis Date Noted    Adenomatous polyp of descending colon 10/24/2023    Personal history of colonic polyps 10/24/2023     Resolved Ambulatory Problems     Diagnosis Date Noted    No Resolved Ambulatory Problems     Past Medical History:   Diagnosis Date    Essential (primary) hypertension     Kidney infection     Kidney stones      Past Medical History:   Diagnosis Date    Essential (primary) hypertension     Kidney infection     Kidney stones        Tertiary Physical Exam:     Physical Exam  General:  Well developed, well nourished, no acute distress  HEENT:  Normocephalic, small contusion posterior scalp  CV:  RR, 2+ radials bilaterally  Resp/chest: NWOB  GI:  Abdomen soft, non-tender, non-distended  MSK:  No muscle atrophy, cyanosis, peripheral edema, moving all extremities spontaneously.   Neuro: GCS 15. CNII-XII grossly intact, alert and oriented to person, place, and time. Strength and motor function grossly intact to all extremities, sensation intact to all extremities.  Skin/Wounds:  warm, dry    Imaging Review:     Imaging Results               CT Head Without Contrast (Final result)  Result time 03/03/25 15:52:14      Final result by Ronald Gama MD (03/03/25 15:52:14)                   Impression:      Couple of punctate areas of hyperattenuation seen as outlined above could represent small areas of hemorrhage as outlined above.   Short-term follow-up is recommended    Cephalhematoma posteriorly in the midline near the cerebral convexity    This report was flagged in Epic as abnormal.      Electronically signed by: Bhupinder Gama  Date:    03/03/2025  Time:    15:52               Narrative:    EXAMINATION:  CT HEAD WITHOUT CONTRAST    CLINICAL HISTORY:  Head trauma, moderate-severe;    TECHNIQUE:  Multiple axial images were obtained from the base of the brain to the vertex without contrast administration.  Sagittal and coronal reconstructions were performed. .Automatic exposure control  (AEC) is utilized to reduce patient radiation exposure.    COMPARISON:  None    FINDINGS:  There is no intracranial mass or lesion seen.  There is a punctate area of hyperattenuation in the right frontal region at the cerebral convexity image 40 series 5.  It measures 4 mm.  It could represent a small area of subdural blood.  There is area of increased attenuation involving the cortex of the left frontal region posteriorly on image number 39 through 42 series 5.  This could represent a small parenchymal contusion..  There is a punctate area of increased attenuation along the falx on the left side image 42 series 5.  This could represent a small punctate area of hemorrhage.  Short-term follow-up is recommended.  No midline shift is seen.  No infarct is seen.  The ventricles and basilar cisterns appear normal.  .    Posterior fossa appears normal.  The calvarium is intact.  There is a soft tissue cephalhematoma posteriorly in the midline.  It is near the cerebral convexity.  The paranasal sinuses shows evidence of bilateral maxillary sinusitis.  None.                                       Lab Review:   CBC:  Recent Labs   Lab Result Units 03/03/25  1643 03/04/25  0401   WBC x10(3)/mcL 14.16* 11.30   RBC x10(6)/mcL 4.16* 3.82*   Hgb g/dL 12.9* 11.8*   Hct % 38.6* 35.3*   Platelet x10(3)/mcL 321 270   MCV fL 92.8 92.4   MCH pg 31.0 30.9   MCHC g/dL 33.4 33.4  "      CMP:  Recent Labs   Lab Result Units 03/03/25  1643 03/04/25  0401   Calcium mg/dL 9.2 9.1   Albumin g/dL 3.7 3.4   Sodium mmol/L 140 140   Potassium mmol/L 4.0 4.1   CO2 mmol/L 26 31   Chloride mmol/L 102 101   Blood Urea Nitrogen mg/dL 17.2 22.5   Creatinine mg/dL 1.07 1.13   ALP unit/L 89 82   ALT unit/L 16 14   AST unit/L 21 16   Bilirubin Total mg/dL 0.5 0.6       Troponin:  No results for input(s): "TROPONINI" in the last 2160 hours.    ETOH:  No results for input(s): "ETHANOL" in the last 72 hours.     Urine Drug Screen:  No results for input(s): "COCAINE", "OPIATE", "BARBITURATE", "AMPHETAMINE", "FENTANYL", "CANNABINOIDS", "MDMA" in the last 72 hours.    Invalid input(s): "BENZODIAZEPINE", "PHENCYCLIDINE"   Plan:   Punctate SDH/IPH  - Neurosurgery following  - Q2H neuro checks  - Keppra BID x 7 days  - BP < 150/90  - CT Head in AM     Fall from   - Cardiac diet  - Daily labs   - MM pain control  - Frequent IS  - SCDs and Holding anticoagulation in light of ICH      Vandana Donohue [facility]  Trauma Surgery      "

## 2025-03-04 NOTE — DISCHARGE SUMMARY
[facility] - Neurology  General Surgery  Discharge Summary      Patient Name: Eligio Phillip  MRN: [id number]  Admission Date: 3/3/2025  Hospital Length of Stay: 0 days  Discharge Date and Time: 3/4/2025 12:44 PM  Attending Physician: No att. providers found   Discharging Provider: ALBERTO Ventura  Primary Care Provider: Andrew Glover Jr., MD    HPI: Patient is a 79 year old male who presents following a fall from his wheelchair. Reports was going up the ramp forward and fell back striking his head. Reports he usually goes up backwards but thought he could go forward. Questionable loss of consciousness by report. Headache following fall, relieved with Advil. He reports he takes a baby aspirin daily. CT head imaging significant for punctate hemorrhages. Neurosurgery recommends admission with repeat CT in AM. Patient reports uses WC at home due to neuropathy.     * No surgery found *      Indwelling Lines/Drains at time of discharge:   Lines/Drains/Airways       None                 Hospital Course: Patient admitted on 3/3 with punctate hemorrhages in the brain after falling back in his wheelchair and hitting his head. Repeat CT head the next morning did not show any punctate hemorrhage. Patient medically stable. Tolerating regular diet. Voiding spontaneously. In wheelchair at baseline. Discharged home with instructions to follow up with PCP.    Goals of Care Treatment Preferences:  Code Status: Full Code      Consults:   Consults (From admission, onward)          Status Ordering Provider     Inpatient consult to Neurosurgery  Once        Provider:  Meet Castellano MD    Completed JAN MORALES            Significant Diagnostic Studies:   Imaging Results               CT Head Without Contrast (Final result)  Result time 03/03/25 15:52:14      Final result by Ronald Gama MD (03/03/25 15:52:14)                   Impression:      Couple of punctate areas of hyperattenuation  seen as outlined above could represent small areas of hemorrhage as outlined above.  Short-term follow-up is recommended    Cephalhematoma posteriorly in the midline near the cerebral convexity    This report was flagged in Epic as abnormal.      Electronically signed by: Bhupinder Gama  Date:    03/03/2025  Time:    15:52               Narrative:    EXAMINATION:  CT HEAD WITHOUT CONTRAST    CLINICAL HISTORY:  Head trauma, moderate-severe;    TECHNIQUE:  Multiple axial images were obtained from the base of the brain to the vertex without contrast administration.  Sagittal and coronal reconstructions were performed. .Automatic exposure control  (AEC) is utilized to reduce patient radiation exposure.    COMPARISON:  None    FINDINGS:  There is no intracranial mass or lesion seen.  There is a punctate area of hyperattenuation in the right frontal region at the cerebral convexity image 40 series 5.  It measures 4 mm.  It could represent a small area of subdural blood.  There is area of increased attenuation involving the cortex of the left frontal region posteriorly on image number 39 through 42 series 5.  This could represent a small parenchymal contusion..  There is a punctate area of increased attenuation along the falx on the left side image 42 series 5.  This could represent a small punctate area of hemorrhage.  Short-term follow-up is recommended.  No midline shift is seen.  No infarct is seen.  The ventricles and basilar cisterns appear normal.  .    Posterior fossa appears normal.  The calvarium is intact.  There is a soft tissue cephalhematoma posteriorly in the midline.  It is near the cerebral convexity.  The paranasal sinuses shows evidence of bilateral maxillary sinusitis.  None.                                        Pending Diagnostic Studies:       None          Final Active Diagnoses:    Diagnosis Date Noted POA    PRINCIPAL PROBLEM:  Subdural hematoma [S06.5XAA] 03/03/2025 Yes    Intraparenchymal  hematoma of brain due to trauma [S06.33AA] 03/03/2025 Yes      Problems Resolved During this Admission:      Discharged Condition: good    Disposition: Home or Self Care    Follow Up:   Follow-up Information       Callais, Gene T. Jr., MD. Schedule an appointment as soon as possible for a visit in 1 week(s).    Specialty: Family Medicine  Why: For hospital follow up  Contact information:  [address] [place] [address] [address] & [address]  [facility]  [place] [address]  [phone]               [facility] Call.    Specialty: Trauma Surgery  Why: As needed  Contact information:  [address] [place] [address] [address] [place] [address] [phone]                         Patient Instructions:   No discharge procedures on file.  Medications:  Reconciled Home Medications:      Medication List        PAUSE taking these medications      aspirin 81 MG EC tablet  Wait to take this until: March 11, 2025  Commonly known as: ECOTRIN  Take 81 mg by mouth once daily.            START taking these medications      gabapentin 300 MG capsule  Commonly known as: NEURONTIN  Take 1 capsule (300 mg total) by mouth 3 (three) times daily. for 10 days     levETIRAcetam 500 MG Tab  Commonly known as: KEPPRA  Take 1 tablet (500 mg total) by mouth 2 (two) times daily. for 6 days     methocarbamoL 500 MG Tab  Commonly known as: Robaxin  Take 1 tablet (500 mg total) by mouth every 8 (eight) hours as needed (muscle spasms).            CONTINUE taking these medications      b complex vitamins tablet  Take 1 tablet by mouth once daily.     finasteride 5 mg tablet  Commonly known as: PROSCAR  Take 5 mg by mouth once daily.     fluticasone 27.5 mcg/actuation nasal spray  Commonly known as: VERAMYST  2 sprays by Nasal route daily as needed for Rhinitis.     loratadine 10 mg tablet  Commonly known as: CLARITIN  Take 10 mg by mouth once daily.     losartan-hydrochlorothiazide 100-12.5 mg 100-12.5 mg Tab  Commonly known as:  HYZAAR  Take 1 tablet by mouth once daily.     montelukast 10 mg tablet  Commonly known as: SINGULAIR  Take 10 mg by mouth every evening.     simvastatin 10 MG tablet  Commonly known as: ZOCOR  Take 10 mg by mouth every evening.     tamsulosin 0.4 mg Cap  Commonly known as: FLOMAX  Take 0.4 mg by mouth once daily.     VITAMIN D ORAL  Take by mouth.            Time spent on the discharge of patient: 20 minutes    ALBERTO Ventura  Trauma Surgery

## 2025-03-04 NOTE — PROGRESS NOTES
Discharge instructions reviewed with patient and wife. Patient leaving via own wheelchair, stable on room air. Patient x1 standby assist to wheelchair. IV removed. Patient to be transported downstairs by natalie whenever ready.

## 2025-03-04 NOTE — CONSULTS
[facility] - Neurology  Neurosurgery  Consult Note    Inpatient consult to Neurosurgery  Consult performed by: Sandy Cao NP  Consult ordered by: Jerry Man DO        Subjective:     Chief Complaint/Reason for Admission: SDH    History of Present Illness:    Patient is a 79-year-old male with a past medical history of HTN, HLD, and neuropathy who presented to [facility] ED after a fall.  He reported he was going up on the ramp with his wheelchair forward and fell back striking his head.  He stated that he usually goes up the ramp backwards in his wheelchair.  He was unsure if he lost consciousness after he fell.  After the fall, patient complained of a headache and took an Advil.  The initial CT head imaging showed questionable hemorrhage of the brain.  Neurosurgery is consulted for further evaluation.    CT head without contrast on 3/3/2025 at 1545 showed:  FINDINGS:  There is no intracranial mass or lesion seen.  There is a punctate area of hyperattenuation in the right frontal region at the cerebral convexity image 40 series 5.  It measures 4 mm.  It could represent a small area of subdural blood.  There is area of increased attenuation involving the cortex of the left frontal region posteriorly on image number 39 through 42 series 5.  This could represent a small parenchymal contusion..  There is a punctate area of increased attenuation along the falx on the left side image 42 series 5.  This could represent a small punctate area of hemorrhage.  Short-term follow-up is recommended.  No midline shift is seen.  No infarct is seen.  The ventricles and basilar cisterns appear normal.  .     Posterior fossa appears normal.  The calvarium is intact.  There is a soft tissue cephalhematoma posteriorly in the midline.  It is near the cerebral convexity.  The paranasal sinuses shows evidence of bilateral maxillary sinusitis.  None.     Impression:     Couple of punctate areas of hyperattenuation seen as  outlined above could represent small areas of hemorrhage as outlined above.  Short-term follow-up is recommended     Cephalhematoma posteriorly in the midline near the cerebral convexity      On physical examination, patient is laying in bed with no family members at the bedside.  He is awake, alert, and oriented to self, place, month, and year.  He open eyes spontaneously.  His speech is clear and coherent.  No pronator drift.  He has full strength in the BUE. He has 4/5 strength in BLE.  He denies headache, dizziness, and visual disturbances.  He complains of some soreness in the back of his head where he hit his head and there is a small bruise. He states he takes a baby aspirin.       PTA Medications   Medication Sig    aspirin (ECOTRIN) 81 MG EC tablet Take 81 mg by mouth once daily.    b complex vitamins tablet Take 1 tablet by mouth once daily.    ciprofloxacin HCl (CIPRO) 500 MG tablet Take 500 mg by mouth 2 (two) times daily.    ergocalciferol, vitamin D2, (VITAMIN D ORAL) Take by mouth.    finasteride (PROSCAR) 5 mg tablet Take 5 mg by mouth once daily.    fluticasone (VERAMYST) 27.5 mcg/actuation nasal spray 2 sprays by Nasal route daily as needed for Rhinitis.    losartan-hydrochlorothiazide 100-12.5 mg (HYZAAR) 100-12.5 mg Tab Take 1 tablet by mouth once daily.    simvastatin (ZOCOR) 10 MG tablet Take 10 mg by mouth every evening.    tamsulosin (FLOMAX) 0.4 mg Cap Take 0.4 mg by mouth once daily.       Review of patient's allergies indicates:  No Known Allergies    Past Medical History:   Diagnosis Date    Essential (primary) hypertension     Kidney infection     Kidney stones      Past Surgical History:   Procedure Laterality Date    COLONOSCOPY, WITH 1 OR MORE BIOPSIES N/A 10/24/2023    Procedure: COLON;  Surgeon: Kamaljit Hines MD;  Location: [facility] ENDOSCOPY;  Service: Gastroenterology;  Laterality: N/A;    SINUS SURGERY       Family History    None       Tobacco Use    Smoking status: Former      Types: Cigarettes     Start date: 1963    Smokeless tobacco: Current     Types: Chew   Substance and Sexual Activity    Alcohol use: Not on file    Drug use: Not on file    Sexual activity: Not on file     Review of Systems   Constitutional: Negative.    HENT: Negative.     Eyes: Negative.    Respiratory: Negative.     Cardiovascular: Negative.    Gastrointestinal: Negative.    Endocrine: Negative.    Genitourinary: Negative.    Musculoskeletal: Negative.    Skin:         Bruise in the back of head   Allergic/Immunologic: Negative.    Neurological: Negative.    Hematological: Negative.    Psychiatric/Behavioral: Negative.       Objective:     Weight: 70.3 kg (154 lb 15.7 oz)  Body mass index is 22.24 kg/m².  Vital Signs (Most Recent):  Temp: 97.5 °F (36.4 °C) (03/04/25 0722)  Pulse: 78 (03/04/25 0722)  Resp: 19 (03/04/25 0722)  BP: 107/74 (03/04/25 0722)  SpO2: (!) 94 % (03/04/25 0722) Vital Signs (24h Range):  Temp:  [97.4 °F (36.3 °C)-97.9 °F (36.6 °C)] 97.5 °F (36.4 °C)  Pulse:  [] 78  Resp:  [15-20] 19  SpO2:  [93 %-98 %] 94 %  BP: (107-151)/(71-83) 107/74                              Physical Exam:  Nursing note and vitals reviewed.    Constitutional: He appears well-developed and well-nourished. No distress.     Eyes: Pupils are equal, round, and reactive to light. EOM are normal.     Cardiovascular: Normal pulses and intact distal pulses.     Abdominal: Soft. Bowel sounds are normal.     Skin:   Bruise in the back of head with no opening or drainage     Psych/Behavior: He is alert. He is oriented to person, place, and time. He has a normal mood and affect.     Musculoskeletal:      Comments: 5/5 strength in BUE  4/5 strength in BLE     Neurological:   GCS: 15  E: 4, V: 5, M: 6  Awake, alert and oriented to self, place, month, and year   Opens eyes spontaneously   Pupils are 3 mm, equal and brisk   Speech is clear and coherent   No facial droop   No pronator drift          Significant Labs:  Recent  Labs   Lab 03/03/25  1643 03/04/25  0401    140   K 4.0 4.1    101   CO2 26 31   BUN 17.2 22.5   CREATININE 1.07 1.13   CALCIUM 9.2 9.1   MG  --  1.90     Recent Labs   Lab 03/03/25  1643 03/04/25  0401   WBC 14.16* 11.30   HGB 12.9* 11.8*   HCT 38.6* 35.3*    270     Recent Labs   Lab 03/03/25  1643   INR 0.9   APTT 35.4*     Microbiology Results (last 7 days)       ** No results found for the last 168 hours. **            Significant Diagnostics:  CT head without contrast on 3/3/2025 at 1545 showed:  FINDINGS:  There is no intracranial mass or lesion seen.  There is a punctate area of hyperattenuation in the right frontal region at the cerebral convexity image 40 series 5.  It measures 4 mm.  It could represent a small area of subdural blood.  There is area of increased attenuation involving the cortex of the left frontal region posteriorly on image number 39 through 42 series 5.  This could represent a small parenchymal contusion..  There is a punctate area of increased attenuation along the falx on the left side image 42 series 5.  This could represent a small punctate area of hemorrhage.  Short-term follow-up is recommended.  No midline shift is seen.  No infarct is seen.  The ventricles and basilar cisterns appear normal.  .     Posterior fossa appears normal.  The calvarium is intact.  There is a soft tissue cephalhematoma posteriorly in the midline.  It is near the cerebral convexity.  The paranasal sinuses shows evidence of bilateral maxillary sinusitis.  None.     Impression:     Couple of punctate areas of hyperattenuation seen as outlined above could represent small areas of hemorrhage as outlined above.  Short-term follow-up is recommended     Cephalhematoma posteriorly in the midline near the cerebral convexity      Repeat CT head without contrast on 3/4/2025 at 0557 showed:  FINDINGS:  BRAIN: Gray white differentiation is maintained. White matter is within normal limits for age.  No  hemorrhage. No edema. No mass effect or midline shift.  The posterior fossa and midline structures are unremarkable.  There are mild atherosclerotic calcifications.  Mild cerebral atrophy.     VENTRICLES: Normal in size and configuration.     EXTRA-AXIAL: No abnormal extra-axial collections.     BONES: Calvarium is intact.     SINUSES AND MASTOIDS: Visualized paranasal sinuses and mastoid air cells are clear.     Impression:     No appreciable acute intracranial abnormality.  No convincing hemorrhage.  Finding described on prior exam favored to be artifact.     The preliminary and final reports are concordant.      Assessment/Plan:      Plan:  - Floor status  - Neuro checks Q4H  - Pain control  - SCDs for DVT prophylaxis, recommend to start prophylactic lovenox      Patient does not need acute neurosurgical intervention.  Repeat CT head this a.m. shows no hemorrhage.  He can restart his baby aspirin in 1 week.  He does not need to follow up in neurosurgery clinic.  Please reach out with any questions or concerns.  Neurosurgery is signing off.     Active Diagnoses:    Diagnosis Date Noted POA    PRINCIPAL PROBLEM:  Subdural hematoma [S06.5XAA] 03/03/2025 Yes    Intraparenchymal hematoma of brain due to trauma [S06.33AA] 03/03/2025 Yes      Problems Resolved During this Admission:       Thank you for your consult.       JERRELL Dhaliwal-BC  Neurosurgery  [facility] - Neurology

## 2025-03-21 ENCOUNTER — TELEPHONE (OUTPATIENT)
Facility: CLINIC | Age: 80
End: 2025-03-21
Payer: MEDICARE

## 2025-07-07 ENCOUNTER — ANESTHESIA EVENT (OUTPATIENT)
Dept: ENDOSCOPY | Facility: HOSPITAL | Age: 80
End: 2025-07-07
Payer: MEDICARE

## 2025-07-07 NOTE — ANESTHESIA PREPROCEDURE EVALUATION
07/07/2025  Eligio Phillip is a 79 y.o., male presents as an outpatient for EGD (dysphagia).      Lab Results   Component Value Date    WBC 10 03/20/2025    HGB 12.6 (L) 03/20/2025    HCT 39.7 03/20/2025    MCV 92.4 03/04/2025     03/20/2025            Chemistry        Component Value Date/Time     03/04/2025 0401    K 4.1 03/04/2025 0401     03/04/2025 0401    CO2 31 03/04/2025 0401    BUN 22.5 03/04/2025 0401    CREATININE 1.13 03/04/2025 0401    GLU 95 03/04/2025 0401        Component Value Date/Time    CALCIUM 9.1 03/04/2025 0401    ALKPHOS 82 03/04/2025 0401    AST 16 03/04/2025 0401    ALT 14 03/04/2025 0401    BILITOT 0.6 03/04/2025 0401          Pre-op Assessment    I have reviewed the Patient Summary Reports.    I have reviewed the NPO Status.   I have reviewed the Medications.     Review of Systems  Anesthesia Hx:               Denies Personal Hx of Anesthesia complications.                    Social:  Non-Smoker       Hematology/Oncology:       -- Anemia:                                  Cardiovascular:     Hypertension                  Functional Capacity 2 METS, Ambulates with wheelchair secondary to neuropathy                         Renal/:   renal calculi                   Physical Exam  General: Well nourished, Cooperative, Alert and Oriented    Airway:  Mallampati: II   Mouth Opening: Normal  TM Distance: Normal  Tongue: Normal  Neck ROM: Normal ROM    Dental:  Edentulous    Chest/Lungs:  Clear to auscultation, Normal Respiratory Rate    Heart:  Rate: Normal  Rhythm: Regular Rhythm        Anesthesia Plan  Type of Anesthesia, risks & benefits discussed:    Anesthesia Type: Gen Natural Airway  Intra-op Monitoring Plan: Standard ASA Monitors  Induction:  IV  Informed Consent: Informed consent signed with the Patient and all parties understand the risks and agree with  anesthesia plan.  All questions answered.   ASA Score: 3  Day of Surgery Review of History & Physical: H&P Update referred to the surgeon/provider.    Ready For Surgery From Anesthesia Perspective.     .

## 2025-07-08 ENCOUNTER — HOSPITAL ENCOUNTER (OUTPATIENT)
Facility: HOSPITAL | Age: 80
Discharge: HOME | End: 2025-07-08
Attending: INTERNAL MEDICINE | Admitting: INTERNAL MEDICINE
Payer: MEDICARE

## 2025-07-08 ENCOUNTER — ANESTHESIA (OUTPATIENT)
Dept: ENDOSCOPY | Facility: HOSPITAL | Age: 80
End: 2025-07-08
Payer: MEDICARE

## 2025-07-08 VITALS
TEMPERATURE: 97 F | OXYGEN SATURATION: 96 % | RESPIRATION RATE: 13 BRPM | DIASTOLIC BLOOD PRESSURE: 81 MMHG | SYSTOLIC BLOOD PRESSURE: 138 MMHG | HEART RATE: 77 BPM

## 2025-07-08 DIAGNOSIS — R13.19 ESOPHAGEAL DYSPHAGIA: Primary | ICD-10-CM

## 2025-07-08 DIAGNOSIS — R13.10 DYSPHAGIA, UNSPECIFIED TYPE: ICD-10-CM

## 2025-07-08 PROCEDURE — 63600175 PHARM REV CODE 636 W HCPCS: Performed by: NURSE ANESTHETIST, CERTIFIED REGISTERED

## 2025-07-08 PROCEDURE — 25000003 PHARM REV CODE 250: Performed by: NURSE ANESTHETIST, CERTIFIED REGISTERED

## 2025-07-08 PROCEDURE — 43239 EGD BIOPSY SINGLE/MULTIPLE: CPT | Performed by: INTERNAL MEDICINE

## 2025-07-08 PROCEDURE — 37000009 HC ANESTHESIA EA ADD 15 MINS: Performed by: INTERNAL MEDICINE

## 2025-07-08 PROCEDURE — 43450 DILATE ESOPHAGUS 1/MULT PASS: CPT | Performed by: INTERNAL MEDICINE

## 2025-07-08 PROCEDURE — 27201423 OPTIME MED/SURG SUP & DEVICES STERILE SUPPLY: Performed by: INTERNAL MEDICINE

## 2025-07-08 PROCEDURE — 37000008 HC ANESTHESIA 1ST 15 MINUTES: Performed by: INTERNAL MEDICINE

## 2025-07-08 RX ORDER — LIDOCAINE HYDROCHLORIDE 20 MG/ML
INJECTION, SOLUTION EPIDURAL; INFILTRATION; INTRACAUDAL; PERINEURAL
Status: DISCONTINUED
Start: 2025-07-08 | End: 2025-07-08 | Stop reason: HOSPADM

## 2025-07-08 RX ORDER — SODIUM CHLORIDE, SODIUM GLUCONATE, SODIUM ACETATE, POTASSIUM CHLORIDE AND MAGNESIUM CHLORIDE 30; 37; 368; 526; 502 MG/100ML; MG/100ML; MG/100ML; MG/100ML; MG/100ML
INJECTION, SOLUTION INTRAVENOUS CONTINUOUS
Status: DISCONTINUED | OUTPATIENT
Start: 2025-07-08 | End: 2025-07-08 | Stop reason: HOSPADM

## 2025-07-08 RX ORDER — GLUCAGON 1 MG
1 KIT INJECTION
Status: DISCONTINUED | OUTPATIENT
Start: 2025-07-08 | End: 2025-07-08 | Stop reason: HOSPADM

## 2025-07-08 RX ORDER — LIDOCAINE HYDROCHLORIDE 10 MG/ML
INJECTION, SOLUTION EPIDURAL; INFILTRATION; INTRACAUDAL; PERINEURAL
Status: DISCONTINUED | OUTPATIENT
Start: 2025-07-08 | End: 2025-07-08

## 2025-07-08 RX ORDER — PANTOPRAZOLE SODIUM 40 MG/1
40 TABLET, DELAYED RELEASE ORAL DAILY
Qty: 90 TABLET | Refills: 3 | Status: SHIPPED | OUTPATIENT
Start: 2025-07-08 | End: 2026-07-08

## 2025-07-08 RX ORDER — MEPERIDINE HYDROCHLORIDE 25 MG/ML
12.5 INJECTION INTRAMUSCULAR; INTRAVENOUS; SUBCUTANEOUS EVERY 10 MIN PRN
Status: DISCONTINUED | OUTPATIENT
Start: 2025-07-08 | End: 2025-07-08 | Stop reason: HOSPADM

## 2025-07-08 RX ORDER — IPRATROPIUM BROMIDE AND ALBUTEROL SULFATE 2.5; .5 MG/3ML; MG/3ML
3 SOLUTION RESPIRATORY (INHALATION)
Status: DISCONTINUED | OUTPATIENT
Start: 2025-07-08 | End: 2025-07-08 | Stop reason: HOSPADM

## 2025-07-08 RX ORDER — ONDANSETRON 4 MG/1
8 TABLET, ORALLY DISINTEGRATING ORAL EVERY 6 HOURS PRN
Status: DISCONTINUED | OUTPATIENT
Start: 2025-07-08 | End: 2025-07-08 | Stop reason: HOSPADM

## 2025-07-08 RX ORDER — PROPOFOL 10 MG/ML
VIAL (ML) INTRAVENOUS
Status: COMPLETED
Start: 2025-07-08 | End: 2025-07-08

## 2025-07-08 RX ORDER — ONDANSETRON HYDROCHLORIDE 2 MG/ML
4 INJECTION, SOLUTION INTRAVENOUS DAILY PRN
Status: DISCONTINUED | OUTPATIENT
Start: 2025-07-08 | End: 2025-07-08 | Stop reason: HOSPADM

## 2025-07-08 RX ORDER — PROPOFOL 10 MG/ML
VIAL (ML) INTRAVENOUS
Status: DISCONTINUED | OUTPATIENT
Start: 2025-07-08 | End: 2025-07-08

## 2025-07-08 RX ORDER — PROCHLORPERAZINE EDISYLATE 5 MG/ML
5 INJECTION INTRAMUSCULAR; INTRAVENOUS EVERY 30 MIN PRN
Status: DISCONTINUED | OUTPATIENT
Start: 2025-07-08 | End: 2025-07-08 | Stop reason: HOSPADM

## 2025-07-08 RX ORDER — LIDOCAINE HYDROCHLORIDE 10 MG/ML
1 INJECTION, SOLUTION EPIDURAL; INFILTRATION; INTRACAUDAL; PERINEURAL ONCE
Status: DISCONTINUED | OUTPATIENT
Start: 2025-07-08 | End: 2025-07-08 | Stop reason: HOSPADM

## 2025-07-08 RX ORDER — LOSARTAN POTASSIUM 50 MG/1
50 TABLET ORAL DAILY
COMMUNITY

## 2025-07-08 RX ORDER — LORAZEPAM 2 MG/ML
0.25 INJECTION INTRAMUSCULAR ONCE AS NEEDED
Status: DISCONTINUED | OUTPATIENT
Start: 2025-07-08 | End: 2025-07-08 | Stop reason: HOSPADM

## 2025-07-08 RX ADMIN — PROPOFOL 40 MG: 10 INJECTION, EMULSION INTRAVENOUS at 08:07

## 2025-07-08 RX ADMIN — SODIUM CHLORIDE, SODIUM GLUCONATE, SODIUM ACETATE, POTASSIUM CHLORIDE AND MAGNESIUM CHLORIDE: 526; 502; 368; 37; 30 INJECTION, SOLUTION INTRAVENOUS at 07:07

## 2025-07-08 RX ADMIN — PROPOFOL 50 MG: 10 INJECTION, EMULSION INTRAVENOUS at 08:07

## 2025-07-08 RX ADMIN — PROPOFOL 30 MG: 10 INJECTION, EMULSION INTRAVENOUS at 08:07

## 2025-07-08 RX ADMIN — LIDOCAINE HYDROCHLORIDE 100 MG: 10 INJECTION, SOLUTION EPIDURAL; INFILTRATION; INTRACAUDAL; PERINEURAL at 08:07

## 2025-07-08 RX ADMIN — PROPOFOL 10 MG: 10 INJECTION, EMULSION INTRAVENOUS at 08:07

## 2025-07-08 NOTE — H&P
[facility] Endoscopy  Gastroenterology  H&P    Patient Name: Eligio Phillip  MRN: [id number]  Admission Date: 7/8/2025   Primary Care Physician: Andrew Glover Jr., MD  Principal Problem:<principal problem not specified>    Subjective:     History of Present Illness:  This 79-year-old is known from previous evaluation.  He does have a personal history of adenomatous colon polyps, and was last evaluated in reassured in that regard in October of 2023.  We encountered 1 small polyp, and allowed the that would serve as his last routine study.      He does have a history of recurrent dysphagia, dilated by Dr. Diop in my absence back in 2016, he underwent repeat endoscopy and dilation in 2017.  It tends to have a tight stricture and in fact had a mucosal tear with 36 Turkmen dilation at that juncture. Last EGD dilation to 46 in 2022 biopsies: lymphocytic esophagitis     He returns now with recurrent solid food dysphagia.  He denies nausea or vomiting or weight loss or abdominal pain.  He does have a history of prostate cancer, he is plagued by neuropathy with limited mobility, and he has underlying COPD along with hypertension.    Past Medical History:   Diagnosis Date    Essential (primary) hypertension     Kidney infection     Kidney stones        Past Surgical History:   Procedure Laterality Date    COLONOSCOPY, WITH 1 OR MORE BIOPSIES N/A 10/24/2023    Procedure: COLON;  Surgeon: Kamaljit Hines MD;  Location: [facility] ENDOSCOPY;  Service: Gastroenterology;  Laterality: N/A;    SINUS SURGERY         Review of patient's allergies indicates:  No Known Allergies  Family History    None       Tobacco Use    Smoking status: Former     Types: Cigarettes     Start date: 1963    Smokeless tobacco: Current     Types: Chew   Substance and Sexual Activity    Alcohol use: Not on file    Drug use: Not on file    Sexual activity: Not on file     Review of Systems  Objective:     Vital Signs (Most  Recent):  Temp: 97.8 °F (36.6 °C) (07/08/25 0715)  Pulse: 75 (07/08/25 0715)  Resp: (!) 21 (07/08/25 0715)  BP: (!) 150/90 (07/08/25 0715)  SpO2: 96 % (room air) (07/08/25 0715) Vital Signs (24h Range):  Temp:  [97.8 °F (36.6 °C)] 97.8 °F (36.6 °C)  Pulse:  [75] 75  Resp:  [21] 21  SpO2:  [96 %] 96 %  BP: (150)/(90) 150/90        There is no height or weight on file to calculate BMI.    No intake or output data in the 24 hours ending 07/08/25 0743    Lines/Drains/Airways       Peripheral Intravenous Line  Duration             Peripheral IV 07/08/25 0715 20 G Anterior;Distal;Right Forearm <1 day                    Physical Exam  Constitutional:       Comments: Partially edentulous   HENT:      Head: Normocephalic.      Mouth/Throat:      Mouth: Mucous membranes are moist.   Eyes:      Extraocular Movements: Extraocular movements intact.      Pupils: Pupils are equal, round, and reactive to light.   Cardiovascular:      Rate and Rhythm: Normal rate and regular rhythm.      Heart sounds: Normal heart sounds.   Pulmonary:      Breath sounds: Normal breath sounds.   Abdominal:      Palpations: Abdomen is soft.      Tenderness: There is no abdominal tenderness.   Musculoskeletal:         General: Normal range of motion.   Skin:     General: Skin is warm and dry.   Neurological:      General: No focal deficit present.      Mental Status: He is oriented to person, place, and time.   Psychiatric:         Mood and Affect: Mood normal.         Behavior: Behavior normal.           Assessment/Plan:     There are no hospital problems to display for this patient.      79-year-old patient with problematic dysphagia scheduled for upper endoscopy and dilation    Kamaljit Hines MD  Gastroenterology  [facility] - [facility] End[facility]

## 2025-07-08 NOTE — TRANSFER OF CARE
Anesthesia Transfer of Care Note    Patient: Eligio Phlilip    Procedure(s) Performed: Procedure(s) (LRB):  EGD (N/A)  EGD, WITH CLOSED BIOPSY  DILATION, ESOPHAGUS (N/A)    Patient location: GI    Anesthesia Type: MAC and general    Transport from OR: Transported from OR on room air with adequate spontaneous ventilation    Post pain: adequate analgesia    Post assessment: no apparent anesthetic complications    Post vital signs: stable    Level of consciousness: awake    Nausea/Vomiting: no nausea/vomiting    Complications: none    Transfer of care protocol was followed      Last vitals: Visit Vitals  BP (!) 150/90 (BP Location: Left arm, Patient Position: Sitting)   Pulse 75   Temp 36.6 °C (97.8 °F) (Tympanic)   Resp (!) 21   SpO2 96%

## 2025-07-08 NOTE — DISCHARGE SUMMARY
[facility] Endoscopy  Discharge Note  Short Stay    Procedure(s) (LRB):  EGD (N/A)  EGD, WITH CLOSED BIOPSY  DILATION, ESOPHAGUS (N/A)      OUTCOME: Patient tolerated treatment/procedure well without complication and is now ready for discharge.    DISPOSITION: Home or Self Care    FINAL DIAGNOSIS:  <principal problem not specified>    FOLLOWUP: In clinic    DISCHARGE INSTRUCTIONS:    Discharge Procedure Orders   Diet general         Clinical Reference Documents Added to Patient Instructions         Document    UPPER GI ENDOSCOPY DISCHARGE INSTRUCTIONS (ENGLISH)            TIME SPENT ON DISCHARGE: 15 minutes

## 2025-07-08 NOTE — ANESTHESIA POSTPROCEDURE EVALUATION
Anesthesia Post Evaluation    Patient: Eligio Phillip    Procedure(s) Performed: Procedure(s) (LRB):  EGD (N/A)  EGD, WITH CLOSED BIOPSY  DILATION, ESOPHAGUS (N/A)    Final Anesthesia Type: general      Patient location during evaluation: GI PACU  Patient participation: Yes- Able to Participate  Level of consciousness: awake and alert  Post-procedure vital signs: reviewed and stable  Pain management: adequate  Airway patency: patent    PONV status at discharge: No PONV  Anesthetic complications: no      Cardiovascular status: blood pressure returned to baseline  Respiratory status: spontaneous ventilation  Hydration status: euvolemic  Follow-up not needed.              Vitals Value Taken Time   /81 07/08/25 08:34   Temp 36.2 °C (97.2 °F) 07/08/25 08:14   Pulse 77 07/08/25 08:34   Resp 13 07/08/25 08:34   SpO2 96 % 07/08/25 08:34         No case tracking events are documented in the log.      Pain/Violeta Score: Violeta Score: 10 (7/8/2025  8:35 AM)

## 2025-07-08 NOTE — OP NOTE
EGD Report    Referring Physician: Belen    Date of procedure: 07/08/2025     Surgeon: Kamaljit Hines    ASA: 3    Medications: Per anesthesia    Indication: dysphagia    Procedure: EGD biopsy and Mejía dilation    Description of the Procedure: The patient was brought back to the endoscopy suite where the risks, benefits, and alternatives of the procedure were described in detail. The patient was given the opportunity to ask questions and then signed informed consent. Patient was positioned in the left lateral decubitus position, continuous monitoring was initiated, and supplemental oxygen was provided via nasal cannula. Bite block was placed. Adequate sedation was achieved with the above mentioned medications as documented in chart and then titrated during the entire procedure. Under direct visualization the gastroscope was introduced through the oropharynx into the esophagus. The scope was advanced into the stomach and to the second portion of the duodenum. Scope was withdrawn and the mucosa was carefully examined. The entire gastric mucosa was examined, including the fundus with retroflexion. Air was evacuated from the stomach and the scope was withdrawn into the esophagus. The entire esophageal mucosa was examined. The procedure was completed. The patient tolerated the procedure well and was transferred to the recovery area in stable condition.     Estimated Blood Loss: minimal    Complications: none    Findings:  Feline rings and relative luminal stenosis throughout the esophagus, though with a clear-cut stricture at the esophagogastric junction that bled slightly with passage of the regular caliber scope.  Otherwise straightforward exam to the duodenal in its 2nd portion.  Superficial biopsies obtained in the midesophagus with fair ease, to rule out eosinophilic esophagitis.  Subsequent passage of a 42 Bengali Mejía found moderate resistance.  Scope reinserted post dilation found clear-cut mucosal impact  in the cervical esophagus, and no significant impact through the remainder of the body until the esophagogastric junction    Impression and Recommendations:   The patient has significant esophageal stricturing in the cervical esophageal region and at the esophagogastric junction.  Though grossly things have the appearance of the eosinophilic esophagitis, the ease of biopsy suggested other... previous biopsies: lymphocytic esophagitis.   Hopefully he will tolerate today's dilation and find some clear-cut benefit.  Acid suppression would seem reasonable and we will reviewed dietary culprits linked to eosinophilic esophagitis.  A case could be made for repeat endoscopy for further dilation down the line.  I would be surprised if acid suppression has a profound impact on his issues.  If this is clearly eosinophilic esophagitis, Dupixent might be a consideration    Kamaljit Hines

## 2025-07-09 LAB — PSYCHE PATHOLOGY RESULT: NORMAL

## (undated) DEVICE — TIP SUCTION YANKAUER

## (undated) DEVICE — CABLE EKG DL RBBN 3 LEAD DISP

## (undated) DEVICE — LINER MEDI-VAC PPV FLTR 1500CC

## (undated) DEVICE — KIT CANIST SUCTION 1200CC

## (undated) DEVICE — BAG LABGUARD BIOHAZARD 6X9IN

## (undated) DEVICE — SOL IRRI STRL WATER 1000ML

## (undated) DEVICE — CONTAINER SPECIMEN SCREW 4OZ

## (undated) DEVICE — KIT SURGICAL COLON .25 1.1OZ

## (undated) DEVICE — CONTAINER SPEC STRL PATH 4OZ

## (undated) DEVICE — ADAPTER DUAL NSL LUER M-M 7FT

## (undated) DEVICE — BLOCK BLOX BITE DENT RIM 54FR

## (undated) DEVICE — FORCEP BX LG CAP 2.8MMX240CM

## (undated) DEVICE — CONTAINER FORMALIN PREFILLED

## (undated) DEVICE — COLLECTION SPECIMEN NEPTUNE

## (undated) DEVICE — SYRINGE 0.9% NACL 10MIL PREFIL

## (undated) DEVICE — UNDERPAD DISPOSABLE 30X30IN

## (undated) DEVICE — MANIFOLD 4 PORT

## (undated) DEVICE — FORCEP ALLIGATOR 2.8MM W/NDL